# Patient Record
Sex: MALE | Race: WHITE | NOT HISPANIC OR LATINO | Employment: FULL TIME | URBAN - METROPOLITAN AREA
[De-identification: names, ages, dates, MRNs, and addresses within clinical notes are randomized per-mention and may not be internally consistent; named-entity substitution may affect disease eponyms.]

---

## 2017-01-03 ENCOUNTER — ALLSCRIPTS OFFICE VISIT (OUTPATIENT)
Dept: OTHER | Facility: OTHER | Age: 32
End: 2017-01-03

## 2017-02-08 ENCOUNTER — ALLSCRIPTS OFFICE VISIT (OUTPATIENT)
Dept: OTHER | Facility: OTHER | Age: 32
End: 2017-02-08

## 2017-02-27 ENCOUNTER — ALLSCRIPTS OFFICE VISIT (OUTPATIENT)
Dept: OTHER | Facility: OTHER | Age: 32
End: 2017-02-27

## 2017-03-08 ENCOUNTER — ALLSCRIPTS OFFICE VISIT (OUTPATIENT)
Dept: OTHER | Facility: OTHER | Age: 32
End: 2017-03-08

## 2017-03-19 ENCOUNTER — GENERIC CONVERSION - ENCOUNTER (OUTPATIENT)
Dept: OTHER | Facility: OTHER | Age: 32
End: 2017-03-19

## 2017-03-19 ENCOUNTER — HOSPITAL ENCOUNTER (OUTPATIENT)
Facility: HOSPITAL | Age: 32
Setting detail: OBSERVATION
Discharge: HOME | End: 2017-03-20
Attending: EMERGENCY MEDICINE | Admitting: INTERNAL MEDICINE
Payer: COMMERCIAL

## 2017-03-19 ENCOUNTER — APPOINTMENT (EMERGENCY)
Dept: RADIOLOGY | Facility: HOSPITAL | Age: 32
End: 2017-03-19
Payer: COMMERCIAL

## 2017-03-19 DIAGNOSIS — I48.91 ATRIAL FIBRILLATION WITH RAPID VENTRICULAR RESPONSE (HCC): ICD-10-CM

## 2017-03-19 DIAGNOSIS — R00.2 PALPITATIONS: ICD-10-CM

## 2017-03-19 DIAGNOSIS — I48.91 ATRIAL FIBRILLATION (HCC): Primary | ICD-10-CM

## 2017-03-19 LAB
ANION GAP SERPL CALCULATED.3IONS-SCNC: 7 MMOL/L (ref 4–13)
BASOPHILS # BLD AUTO: 0.02 THOUSANDS/ΜL (ref 0–0.1)
BASOPHILS NFR BLD AUTO: 0 % (ref 0–1)
BUN SERPL-MCNC: 17 MG/DL (ref 5–25)
CALCIUM SERPL-MCNC: 9.3 MG/DL (ref 8.3–10.1)
CHLORIDE SERPL-SCNC: 105 MMOL/L (ref 100–108)
CO2 SERPL-SCNC: 32 MMOL/L (ref 21–32)
CREAT SERPL-MCNC: 0.83 MG/DL (ref 0.6–1.3)
EOSINOPHIL # BLD AUTO: 0.09 THOUSAND/ΜL (ref 0–0.61)
EOSINOPHIL NFR BLD AUTO: 1 % (ref 0–6)
ERYTHROCYTE [DISTWIDTH] IN BLOOD BY AUTOMATED COUNT: 12.2 % (ref 11.6–15.1)
GFR SERPL CREATININE-BSD FRML MDRD: >60 ML/MIN/1.73SQ M
GLUCOSE SERPL-MCNC: 76 MG/DL (ref 65–140)
HCT VFR BLD AUTO: 43.6 % (ref 36.5–49.3)
HGB BLD-MCNC: 15.4 G/DL (ref 12–17)
LYMPHOCYTES # BLD AUTO: 3.18 THOUSANDS/ΜL (ref 0.6–4.47)
LYMPHOCYTES NFR BLD AUTO: 35 % (ref 14–44)
MAGNESIUM SERPL-MCNC: 2 MG/DL (ref 1.6–2.6)
MCH RBC QN AUTO: 29.2 PG (ref 26.8–34.3)
MCHC RBC AUTO-ENTMCNC: 35.3 G/DL (ref 31.4–37.4)
MCV RBC AUTO: 83 FL (ref 82–98)
MONOCYTES # BLD AUTO: 0.67 THOUSAND/ΜL (ref 0.17–1.22)
MONOCYTES NFR BLD AUTO: 7 % (ref 4–12)
NEUTROPHILS # BLD AUTO: 5.04 THOUSANDS/ΜL (ref 1.85–7.62)
NEUTS SEG NFR BLD AUTO: 57 % (ref 43–75)
PLATELET # BLD AUTO: 203 THOUSANDS/UL (ref 149–390)
PMV BLD AUTO: 10.6 FL (ref 8.9–12.7)
POTASSIUM SERPL-SCNC: 3.6 MMOL/L (ref 3.5–5.3)
RBC # BLD AUTO: 5.28 MILLION/UL (ref 3.88–5.62)
SODIUM SERPL-SCNC: 144 MMOL/L (ref 136–145)
TROPONIN I SERPL-MCNC: <0.02 NG/ML
TSH SERPL DL<=0.05 MIU/L-ACNC: 1.83 UIU/ML (ref 0.36–3.74)
WBC # BLD AUTO: 9 THOUSAND/UL (ref 4.31–10.16)

## 2017-03-19 PROCEDURE — 36415 COLL VENOUS BLD VENIPUNCTURE: CPT | Performed by: PHYSICIAN ASSISTANT

## 2017-03-19 PROCEDURE — 85025 COMPLETE CBC W/AUTO DIFF WBC: CPT | Performed by: PHYSICIAN ASSISTANT

## 2017-03-19 PROCEDURE — 99285 EMERGENCY DEPT VISIT HI MDM: CPT

## 2017-03-19 PROCEDURE — 96365 THER/PROPH/DIAG IV INF INIT: CPT

## 2017-03-19 PROCEDURE — 84484 ASSAY OF TROPONIN QUANT: CPT | Performed by: PHYSICIAN ASSISTANT

## 2017-03-19 PROCEDURE — 84443 ASSAY THYROID STIM HORMONE: CPT | Performed by: PHYSICIAN ASSISTANT

## 2017-03-19 PROCEDURE — 80048 BASIC METABOLIC PNL TOTAL CA: CPT | Performed by: PHYSICIAN ASSISTANT

## 2017-03-19 PROCEDURE — 83735 ASSAY OF MAGNESIUM: CPT | Performed by: PHYSICIAN ASSISTANT

## 2017-03-19 PROCEDURE — 93005 ELECTROCARDIOGRAM TRACING: CPT | Performed by: PHYSICIAN ASSISTANT

## 2017-03-19 PROCEDURE — 71010 HB CHEST X-RAY 1 VIEW FRONTAL (PORTABLE): CPT

## 2017-03-19 RX ORDER — SODIUM CHLORIDE 9 MG/ML
125 INJECTION, SOLUTION INTRAVENOUS CONTINUOUS
Status: DISCONTINUED | OUTPATIENT
Start: 2017-03-19 | End: 2017-03-19

## 2017-03-19 RX ORDER — ACETAMINOPHEN 325 MG/1
650 TABLET ORAL EVERY 6 HOURS PRN
Status: DISCONTINUED | OUTPATIENT
Start: 2017-03-19 | End: 2017-03-20 | Stop reason: HOSPADM

## 2017-03-19 RX ADMIN — DILTIAZEM HYDROCHLORIDE 10 MG/HR: 5 INJECTION INTRAVENOUS at 21:34

## 2017-03-20 VITALS
WEIGHT: 207.23 LBS | OXYGEN SATURATION: 99 % | BODY MASS INDEX: 29.67 KG/M2 | RESPIRATION RATE: 16 BRPM | SYSTOLIC BLOOD PRESSURE: 94 MMHG | DIASTOLIC BLOOD PRESSURE: 68 MMHG | TEMPERATURE: 98.1 F | HEIGHT: 70 IN | HEART RATE: 70 BPM

## 2017-03-20 PROBLEM — R00.2 PALPITATIONS: Status: RESOLVED | Noted: 2017-03-19 | Resolved: 2017-03-20

## 2017-03-20 LAB
ANION GAP SERPL CALCULATED.3IONS-SCNC: 9 MMOL/L (ref 4–13)
ATRIAL RATE: 182 BPM
BUN SERPL-MCNC: 13 MG/DL (ref 5–25)
CALCIUM SERPL-MCNC: 8.9 MG/DL (ref 8.3–10.1)
CHLORIDE SERPL-SCNC: 105 MMOL/L (ref 100–108)
CO2 SERPL-SCNC: 28 MMOL/L (ref 21–32)
CREAT SERPL-MCNC: 0.71 MG/DL (ref 0.6–1.3)
GFR SERPL CREATININE-BSD FRML MDRD: >60 ML/MIN/1.73SQ M
GLUCOSE P FAST SERPL-MCNC: 84 MG/DL (ref 65–99)
GLUCOSE SERPL-MCNC: 84 MG/DL (ref 65–140)
POTASSIUM SERPL-SCNC: 3.6 MMOL/L (ref 3.5–5.3)
QRS AXIS: 75 DEGREES
QRSD INTERVAL: 86 MS
QT INTERVAL: 288 MS
QTC INTERVAL: 447 MS
SODIUM SERPL-SCNC: 142 MMOL/L (ref 136–145)
T WAVE AXIS: 40 DEGREES
TROPONIN I SERPL-MCNC: <0.02 NG/ML
TROPONIN I SERPL-MCNC: <0.02 NG/ML
VENTRICULAR RATE: 145 BPM

## 2017-03-20 PROCEDURE — 84484 ASSAY OF TROPONIN QUANT: CPT | Performed by: INTERNAL MEDICINE

## 2017-03-20 PROCEDURE — 80048 BASIC METABOLIC PNL TOTAL CA: CPT | Performed by: PHYSICIAN ASSISTANT

## 2017-03-20 RX ORDER — FLECAINIDE ACETATE 50 MG/1
50 TABLET ORAL 2 TIMES DAILY
Qty: 60 TABLET | Refills: 0 | Status: SHIPPED | OUTPATIENT
Start: 2017-03-20 | End: 2017-06-27 | Stop reason: HOSPADM

## 2017-03-20 RX ORDER — FLECAINIDE ACETATE 50 MG/1
300 TABLET ORAL ONCE
Status: COMPLETED | OUTPATIENT
Start: 2017-03-20 | End: 2017-03-20

## 2017-03-20 RX ORDER — POTASSIUM CHLORIDE 20 MEQ/1
40 TABLET, EXTENDED RELEASE ORAL ONCE
Status: COMPLETED | OUTPATIENT
Start: 2017-03-20 | End: 2017-03-20

## 2017-03-20 RX ADMIN — FLECAINIDE ACETATE 300 MG: 50 TABLET ORAL at 12:19

## 2017-03-20 RX ADMIN — POTASSIUM CHLORIDE 40 MEQ: 1500 TABLET, EXTENDED RELEASE ORAL at 12:19

## 2017-03-20 RX ADMIN — DILTIAZEM HYDROCHLORIDE 10 MG/HR: 5 INJECTION INTRAVENOUS at 00:19

## 2017-04-10 ENCOUNTER — ALLSCRIPTS OFFICE VISIT (OUTPATIENT)
Dept: OTHER | Facility: OTHER | Age: 32
End: 2017-04-10

## 2017-04-13 ENCOUNTER — ALLSCRIPTS OFFICE VISIT (OUTPATIENT)
Dept: OTHER | Facility: OTHER | Age: 32
End: 2017-04-13

## 2017-06-26 ENCOUNTER — HOSPITAL ENCOUNTER (OUTPATIENT)
Facility: HOSPITAL | Age: 32
Setting detail: OBSERVATION
Discharge: HOME | End: 2017-06-27
Attending: EMERGENCY MEDICINE | Admitting: INTERNAL MEDICINE
Payer: COMMERCIAL

## 2017-06-26 ENCOUNTER — APPOINTMENT (EMERGENCY)
Dept: RADIOLOGY | Facility: HOSPITAL | Age: 32
End: 2017-06-26
Payer: COMMERCIAL

## 2017-06-26 ENCOUNTER — GENERIC CONVERSION - ENCOUNTER (OUTPATIENT)
Dept: OTHER | Facility: OTHER | Age: 32
End: 2017-06-26

## 2017-06-26 DIAGNOSIS — I95.9 HYPOTENSION: ICD-10-CM

## 2017-06-26 DIAGNOSIS — R55 SYNCOPE: Primary | ICD-10-CM

## 2017-06-26 DIAGNOSIS — Z86.79 ATRIAL FIBRILLATION, CURRENTLY IN SINUS RHYTHM: ICD-10-CM

## 2017-06-26 PROBLEM — I48.0 PAROXYSMAL ATRIAL FIBRILLATION (HCC): Chronic | Status: ACTIVE | Noted: 2017-06-26

## 2017-06-26 LAB
ALBUMIN SERPL BCP-MCNC: 3.5 G/DL (ref 3.5–5)
ALP SERPL-CCNC: 65 U/L (ref 46–116)
ALT SERPL W P-5'-P-CCNC: 29 U/L (ref 12–78)
ANION GAP SERPL CALCULATED.3IONS-SCNC: 8 MMOL/L (ref 4–13)
APTT PPP: 26 SECONDS (ref 23–35)
AST SERPL W P-5'-P-CCNC: 14 U/L (ref 5–45)
ATRIAL RATE: 67 BPM
BASOPHILS # BLD AUTO: 0.02 THOUSANDS/ΜL (ref 0–0.1)
BASOPHILS NFR BLD AUTO: 0 % (ref 0–1)
BILIRUB SERPL-MCNC: 0.4 MG/DL (ref 0.2–1)
BUN SERPL-MCNC: 19 MG/DL (ref 5–25)
CALCIUM SERPL-MCNC: 8.5 MG/DL (ref 8.3–10.1)
CHLORIDE SERPL-SCNC: 106 MMOL/L (ref 100–108)
CO2 SERPL-SCNC: 26 MMOL/L (ref 21–32)
CREAT SERPL-MCNC: 0.73 MG/DL (ref 0.6–1.3)
EOSINOPHIL # BLD AUTO: 0.08 THOUSAND/ΜL (ref 0–0.61)
EOSINOPHIL NFR BLD AUTO: 1 % (ref 0–6)
ERYTHROCYTE [DISTWIDTH] IN BLOOD BY AUTOMATED COUNT: 12.4 % (ref 11.6–15.1)
GFR SERPL CREATININE-BSD FRML MDRD: >60 ML/MIN/1.73SQ M
GLUCOSE SERPL-MCNC: 108 MG/DL (ref 65–140)
HCT VFR BLD AUTO: 43.1 % (ref 36.5–49.3)
HGB BLD-MCNC: 14.9 G/DL (ref 12–17)
INR PPP: 0.94 (ref 0.86–1.16)
LYMPHOCYTES # BLD AUTO: 1.74 THOUSANDS/ΜL (ref 0.6–4.47)
LYMPHOCYTES NFR BLD AUTO: 28 % (ref 14–44)
MAGNESIUM SERPL-MCNC: 2 MG/DL (ref 1.6–2.6)
MCH RBC QN AUTO: 29 PG (ref 26.8–34.3)
MCHC RBC AUTO-ENTMCNC: 34.6 G/DL (ref 31.4–37.4)
MCV RBC AUTO: 84 FL (ref 82–98)
MONOCYTES # BLD AUTO: 0.45 THOUSAND/ΜL (ref 0.17–1.22)
MONOCYTES NFR BLD AUTO: 7 % (ref 4–12)
NEUTROPHILS # BLD AUTO: 3.92 THOUSANDS/ΜL (ref 1.85–7.62)
NEUTS SEG NFR BLD AUTO: 64 % (ref 43–75)
P AXIS: 43 DEGREES
PLATELET # BLD AUTO: 189 THOUSANDS/UL (ref 149–390)
PMV BLD AUTO: 11 FL (ref 8.9–12.7)
POTASSIUM SERPL-SCNC: 4.3 MMOL/L (ref 3.5–5.3)
PR INTERVAL: 140 MS
PROT SERPL-MCNC: 6.6 G/DL (ref 6.4–8.2)
PROTHROMBIN TIME: 12.9 SECONDS (ref 12.1–14.4)
QRS AXIS: 83 DEGREES
QRSD INTERVAL: 108 MS
QT INTERVAL: 400 MS
QTC INTERVAL: 422 MS
RBC # BLD AUTO: 5.13 MILLION/UL (ref 3.88–5.62)
SODIUM SERPL-SCNC: 140 MMOL/L (ref 136–145)
T WAVE AXIS: 47 DEGREES
TROPONIN I SERPL-MCNC: <0.02 NG/ML
TSH SERPL DL<=0.05 MIU/L-ACNC: 1.34 UIU/ML (ref 0.36–3.74)
VENTRICULAR RATE: 67 BPM
WBC # BLD AUTO: 6.21 THOUSAND/UL (ref 4.31–10.16)

## 2017-06-26 PROCEDURE — 85730 THROMBOPLASTIN TIME PARTIAL: CPT | Performed by: EMERGENCY MEDICINE

## 2017-06-26 PROCEDURE — 85025 COMPLETE CBC W/AUTO DIFF WBC: CPT | Performed by: EMERGENCY MEDICINE

## 2017-06-26 PROCEDURE — 93005 ELECTROCARDIOGRAM TRACING: CPT | Performed by: EMERGENCY MEDICINE

## 2017-06-26 PROCEDURE — 93005 ELECTROCARDIOGRAM TRACING: CPT

## 2017-06-26 PROCEDURE — 83735 ASSAY OF MAGNESIUM: CPT | Performed by: EMERGENCY MEDICINE

## 2017-06-26 PROCEDURE — 36415 COLL VENOUS BLD VENIPUNCTURE: CPT | Performed by: EMERGENCY MEDICINE

## 2017-06-26 PROCEDURE — 96360 HYDRATION IV INFUSION INIT: CPT

## 2017-06-26 PROCEDURE — 84443 ASSAY THYROID STIM HORMONE: CPT | Performed by: EMERGENCY MEDICINE

## 2017-06-26 PROCEDURE — 80053 COMPREHEN METABOLIC PANEL: CPT | Performed by: EMERGENCY MEDICINE

## 2017-06-26 PROCEDURE — 71020 HB CHEST X-RAY 2VW FRONTAL&LATL: CPT

## 2017-06-26 PROCEDURE — 84484 ASSAY OF TROPONIN QUANT: CPT | Performed by: EMERGENCY MEDICINE

## 2017-06-26 PROCEDURE — 85610 PROTHROMBIN TIME: CPT | Performed by: EMERGENCY MEDICINE

## 2017-06-26 PROCEDURE — 99285 EMERGENCY DEPT VISIT HI MDM: CPT

## 2017-06-26 RX ORDER — MAGNESIUM HYDROXIDE/ALUMINUM HYDROXICE/SIMETHICONE 120; 1200; 1200 MG/30ML; MG/30ML; MG/30ML
30 SUSPENSION ORAL EVERY 6 HOURS PRN
Status: DISCONTINUED | OUTPATIENT
Start: 2017-06-26 | End: 2017-06-27 | Stop reason: HOSPADM

## 2017-06-26 RX ORDER — ONDANSETRON 2 MG/ML
4 INJECTION INTRAMUSCULAR; INTRAVENOUS EVERY 6 HOURS PRN
Status: DISCONTINUED | OUTPATIENT
Start: 2017-06-26 | End: 2017-06-27 | Stop reason: HOSPADM

## 2017-06-26 RX ORDER — ACETAMINOPHEN 325 MG/1
650 TABLET ORAL EVERY 6 HOURS PRN
Status: DISCONTINUED | OUTPATIENT
Start: 2017-06-26 | End: 2017-06-27 | Stop reason: HOSPADM

## 2017-06-26 RX ORDER — SODIUM CHLORIDE 9 MG/ML
125 INJECTION, SOLUTION INTRAVENOUS CONTINUOUS
Status: DISCONTINUED | OUTPATIENT
Start: 2017-06-26 | End: 2017-06-27 | Stop reason: HOSPADM

## 2017-06-26 RX ADMIN — SODIUM CHLORIDE 1000 ML: 0.9 INJECTION, SOLUTION INTRAVENOUS at 09:00

## 2017-06-26 RX ADMIN — SODIUM CHLORIDE 125 ML/HR: 0.9 INJECTION, SOLUTION INTRAVENOUS at 13:45

## 2017-06-26 RX ADMIN — SODIUM CHLORIDE 125 ML/HR: 0.9 INJECTION, SOLUTION INTRAVENOUS at 19:34

## 2017-06-26 RX ADMIN — SODIUM CHLORIDE 1000 ML: 0.9 INJECTION, SOLUTION INTRAVENOUS at 12:56

## 2017-06-27 ENCOUNTER — GENERIC CONVERSION - ENCOUNTER (OUTPATIENT)
Dept: OTHER | Facility: OTHER | Age: 32
End: 2017-06-27

## 2017-06-27 VITALS
RESPIRATION RATE: 18 BRPM | DIASTOLIC BLOOD PRESSURE: 72 MMHG | HEART RATE: 62 BPM | OXYGEN SATURATION: 98 % | TEMPERATURE: 97.8 F | WEIGHT: 183.2 LBS | SYSTOLIC BLOOD PRESSURE: 124 MMHG | BODY MASS INDEX: 27.13 KG/M2 | HEIGHT: 69 IN

## 2017-06-27 RX ADMIN — SODIUM CHLORIDE 125 ML/HR: 0.9 INJECTION, SOLUTION INTRAVENOUS at 03:28

## 2017-07-12 ENCOUNTER — HOSPITAL ENCOUNTER (EMERGENCY)
Facility: HOSPITAL | Age: 32
Discharge: HOME | End: 2017-07-12
Attending: EMERGENCY MEDICINE | Admitting: EMERGENCY MEDICINE
Payer: COMMERCIAL

## 2017-07-12 ENCOUNTER — GENERIC CONVERSION - ENCOUNTER (OUTPATIENT)
Dept: OTHER | Facility: OTHER | Age: 32
End: 2017-07-12

## 2017-07-12 VITALS
WEIGHT: 195 LBS | BODY MASS INDEX: 28.8 KG/M2 | RESPIRATION RATE: 16 BRPM | OXYGEN SATURATION: 96 % | DIASTOLIC BLOOD PRESSURE: 64 MMHG | SYSTOLIC BLOOD PRESSURE: 114 MMHG | HEART RATE: 80 BPM

## 2017-07-12 DIAGNOSIS — R42 LIGHTHEADEDNESS: Primary | ICD-10-CM

## 2017-07-12 LAB
ALBUMIN SERPL BCP-MCNC: 4 G/DL (ref 3.5–5)
ALP SERPL-CCNC: 78 U/L (ref 46–116)
ALT SERPL W P-5'-P-CCNC: 30 U/L (ref 12–78)
ANION GAP SERPL CALCULATED.3IONS-SCNC: 10 MMOL/L (ref 4–13)
AST SERPL W P-5'-P-CCNC: 13 U/L (ref 5–45)
ATRIAL RATE: 78 BPM
BASOPHILS # BLD AUTO: 0.03 THOUSANDS/ΜL (ref 0–0.1)
BASOPHILS NFR BLD AUTO: 0 % (ref 0–1)
BILIRUB SERPL-MCNC: 0.4 MG/DL (ref 0.2–1)
BUN SERPL-MCNC: 20 MG/DL (ref 5–25)
CALCIUM SERPL-MCNC: 9 MG/DL (ref 8.3–10.1)
CHLORIDE SERPL-SCNC: 104 MMOL/L (ref 100–108)
CO2 SERPL-SCNC: 30 MMOL/L (ref 21–32)
CREAT SERPL-MCNC: 1.18 MG/DL (ref 0.6–1.3)
EOSINOPHIL # BLD AUTO: 0.08 THOUSAND/ΜL (ref 0–0.61)
EOSINOPHIL NFR BLD AUTO: 1 % (ref 0–6)
ERYTHROCYTE [DISTWIDTH] IN BLOOD BY AUTOMATED COUNT: 12.3 % (ref 11.6–15.1)
GFR SERPL CREATININE-BSD FRML MDRD: >60 ML/MIN/1.73SQ M
GLUCOSE SERPL-MCNC: 93 MG/DL (ref 65–140)
HCT VFR BLD AUTO: 41.7 % (ref 36.5–49.3)
HGB BLD-MCNC: 14.4 G/DL (ref 12–17)
LYMPHOCYTES # BLD AUTO: 2.05 THOUSANDS/ΜL (ref 0.6–4.47)
LYMPHOCYTES NFR BLD AUTO: 26 % (ref 14–44)
MCH RBC QN AUTO: 28.9 PG (ref 26.8–34.3)
MCHC RBC AUTO-ENTMCNC: 34.5 G/DL (ref 31.4–37.4)
MCV RBC AUTO: 84 FL (ref 82–98)
MONOCYTES # BLD AUTO: 0.39 THOUSAND/ΜL (ref 0.17–1.22)
MONOCYTES NFR BLD AUTO: 5 % (ref 4–12)
NEUTROPHILS # BLD AUTO: 5.34 THOUSANDS/ΜL (ref 1.85–7.62)
NEUTS SEG NFR BLD AUTO: 68 % (ref 43–75)
P AXIS: 61 DEGREES
PLATELET # BLD AUTO: 194 THOUSANDS/UL (ref 149–390)
PMV BLD AUTO: 10.7 FL (ref 8.9–12.7)
POTASSIUM SERPL-SCNC: 3.9 MMOL/L (ref 3.5–5.3)
PR INTERVAL: 136 MS
PROT SERPL-MCNC: 7 G/DL (ref 6.4–8.2)
QRS AXIS: 80 DEGREES
QRSD INTERVAL: 100 MS
QT INTERVAL: 378 MS
QTC INTERVAL: 430 MS
RBC # BLD AUTO: 4.98 MILLION/UL (ref 3.88–5.62)
SODIUM SERPL-SCNC: 144 MMOL/L (ref 136–145)
SPECIMEN SOURCE: NORMAL
T WAVE AXIS: 32 DEGREES
TROPONIN I BLD-MCNC: 0.01 NG/ML (ref 0–0.08)
VENTRICULAR RATE: 78 BPM
WBC # BLD AUTO: 7.89 THOUSAND/UL (ref 4.31–10.16)

## 2017-07-12 PROCEDURE — 99284 EMERGENCY DEPT VISIT MOD MDM: CPT

## 2017-07-12 PROCEDURE — 84484 ASSAY OF TROPONIN QUANT: CPT

## 2017-07-12 PROCEDURE — 85025 COMPLETE CBC W/AUTO DIFF WBC: CPT | Performed by: EMERGENCY MEDICINE

## 2017-07-12 PROCEDURE — 80053 COMPREHEN METABOLIC PANEL: CPT | Performed by: EMERGENCY MEDICINE

## 2017-07-12 PROCEDURE — 36415 COLL VENOUS BLD VENIPUNCTURE: CPT | Performed by: EMERGENCY MEDICINE

## 2017-07-12 PROCEDURE — 93005 ELECTROCARDIOGRAM TRACING: CPT | Performed by: EMERGENCY MEDICINE

## 2017-07-12 RX ORDER — MULTIVITAMIN
1 CAPSULE ORAL DAILY
COMMUNITY

## 2017-07-13 ENCOUNTER — ALLSCRIPTS OFFICE VISIT (OUTPATIENT)
Dept: OTHER | Facility: OTHER | Age: 32
End: 2017-07-13

## 2017-07-31 ENCOUNTER — ALLSCRIPTS OFFICE VISIT (OUTPATIENT)
Dept: OTHER | Facility: OTHER | Age: 32
End: 2017-07-31

## 2017-11-01 ENCOUNTER — ALLSCRIPTS OFFICE VISIT (OUTPATIENT)
Dept: OTHER | Facility: OTHER | Age: 32
End: 2017-11-01

## 2017-11-02 NOTE — PROGRESS NOTES
Assessment  Assessed   1  Atrial fibrillation (427 31) (I48 91)    Plan  Atrial fibrillation    · EKG/ECG- POC; Status:Complete;   Done: 44ZTG0923  Perform: In Office; EWF:65WCY8246; Last Updated By:Luiz He; 11/1/2017   9:08:18 AM;Ordered; For:Atrial fibrillation; Ordered By:Abhinav Boo;    Discussion/Summary  Cardiology Discussion Summary Free Text Note Form [facility]:   30 yo maleAfib w/ RVR Last episode was June 27, he took 200mg flecainide and metoprolol 25mg and syncopized on bed and loop recorder confirmed mutliple pauses 6s,7s, 19s Pause post conversion  Was bradycardic 40bpm after that in ER  For a couple weeks after that had a few dizzy spells and headaches, felt unwell, but all has resolved  Before that had episode on 3/19/17 and before that was 1/17/16   Converted w/ flecainide 300mg in hospital w/o pause  HR up to 160s, felt palpitations, SOB  GHYJU0Vswk=8  He thinks he had 2 less severe episodes prior to his  Nomal TTE  Loop recorder showed episdoe and initiation was just afib, no svt or other rhythm issues  S/P Loop June 2016 placed due to intermittent symptoms of palpitations, since its placed no afib episodes, but oversensing of T waves and noise w/ deive classified afib    had new baby 2 months ago, fortunately no afib  Feels less palpitations from PVC's etc as well  Physically Active at work  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 and negative coronary angiography  Syncope x3, one above w pause, others both related to ETOH, one on New Years one at [facility] both w/ prodrome likely vagal     Continue to observe off meds, AFib will likely recur at some point, when his life is in order (new baby at home) may consider elective afib ablation  If recurrent afib he should go to ER given long conversion pause last time  No prn flecainide/metop at home  in 6 months        Chief Complaint  Chief Complaint Free Text Note Form: f/u afib      History of Present Illness  Cardiology HPI Free Text Note Form [facility]: 28 yo maleAfib w/ RVR Last episode was June 27, he took 200mg flecainide and metoprolol 25mg and syncopized on bed and loop recorder confirmed mutliple pauses 6s,7s, 19s Pause post conversion  Was bradycardic 40bpm after that in ER  For a couple weeks after that had a few dizzy spells and headaches, felt unwell, but all has resolved  Before that had episode on 3/19/17 and before that was 1/17/16   Converted w/ flecainide 300mg in hospital w/o pause  HR up to 160s, felt palpitations, SOB  SHOSZ3Fyzy=4  He thinks he had 2 less severe episodes prior to his  Nomal TTE  Loop recorder showed episdoe and initiation was just afib, no svt or other rhythm issues  S/P Loop June 2016 placed due to intermittent symptoms of palpitations, since its placed no afib episodes, but oversensing of T waves and noise w/ deive classified afib    had new baby 2 months ago, fortunately no afib  Feels less palpitations from PVC's etc as well  Physically Active at work  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 and negative coronary angiography  Syncope x2 both related to ETOH, one on New Years one at [facility] both w/ prodrome likely vagal H/o Atypical CP lasting 1 min at rare occassions both resting and at work, substernal mild stabbing pain  None recently  Review of Systems  Cardiology Male ROS:     Cardiac: No complaints of chest pain, no palpitations, no fainiting  Skin: No complaints of nonhealing sores or skin rash  Genitourinary: No complaints of recurrent urinary tract infections, frequent urination at night, difficult urination, blood in urine, kidney stones, loss of bladder control, no kidney or prostate problems, no erectile dysfunction  Psychological: No complaints of feeling depressed, anxiety, panic attacks, or difficulty concentrating     General: No complaints of trouble sleeping, lack of energy, fatigue, appetite changes, weight changes, fever, frequent infections, or night sweats  Respiratory: No complaints of shortness of breath, cough with sputum, or wheezing  HEENT: No complaints of serious problems, hearing problems, nose problems, throat problems, or snoring  Gastrointestinal: No complaints of liver problems, nausea, vomiting, heartburn, constipation, bloody stools, diarrhea, problems swallowing, adbominal pain, or rectal bleeding  Hematologic: No complaints of bleeding disorders, anemia, blood clots, or excessive brusing  Neurological: No complaints of numbness, tingling, dizziness, weakness, seizures, headaches, syncope or fainting, AM fatigue, daytime sleepiness, no witnessed apnea episodes  Musculoskeletal: No complaints of arthritis, back pain, or painfull swelling  ROS Reviewed:   ROS reviewed  Active Problems  Problems   1  Atrial fibrillation (427 31) (I48 91)  2  Chest pain (786 50) (R07 9)  3  Palpitations (785 1) (R00 2)  4  Pericarditis (423 9) (I31 9)    Past Medical History  Problems   1  History of cardiac cath (V45 89) (B38 292)  2  History of chest pain (V13 89) (Z87 898)  3  History of echocardiogram (V15 89) (Z92 89)  4  History of pericarditis (V12 59) (Z86 79)  5  History of syncope (V15 89) (Z87 898)  6  History of weakness (V13 89) (Z87 898)  7  History of Lightheadedness (780 4) (R42)  Active Problems And Past Medical History Reviewed: The active problems and past medical history were reviewed and updated today  Surgical History  Problems   1  History of Adenoidectomy  Surgical History Reviewed: The surgical history was reviewed and updated today  Family History  Father   1  Family history of Atrial fibrillation, unspecified type  Family History   2  Family history of Diabetes (250 00) (E11 9)  3  Family history of Enlarged heart (429 3) (I51 7)  Family History Reviewed: The family history was reviewed and updated today         Social History  Problems    · Former smoker (Y02 28) (M82 188)   · Full-time employment   ·    · No drug use   · Non-smoker (V49 89) (Z78 9)   · Occasional alcohol use  Social History Reviewed: The social history was reviewed and updated today  Current Meds  1  Metoprolol Succinate ER 25 MG Oral Tablet Extended Release 24 Hour; take 1 tablet   daily prn; Therapy: 87MQG0036 to (Evaluate:51Fye2368)  Requested for: 40OEC2190 Recorded  2  Multiple Vitamins Oral Tablet; Take 1 daily; Therapy: 28BUE0757 to Recorded  Medication List Reviewed: The medication list was reviewed and updated today  Allergies  Medication   1  No Known Drug Allergies    Vitals  Vital Signs    Recorded: 22JFG3224 09:06AM   Heart Rate 68   Systolic 569, RUE, Sitting   Diastolic 74, RUE, Sitting   Height 5 ft 10 in   Weight 194 lb    BMI Calculated 27 84   BSA Calculated 2 06     Physical Exam    Constitutional - General appearance: No acute distress, well appearing and well nourished  Eyes - Conjunctiva and Sclera examination: Conjunctiva pink, sclera anicteric  Ears, Nose, Mouth, and Throat - External inspection of ears and nose: Normal without deformities or discharge  Neck - Normal, no JVD   Pulmonary - Respiratory effort: No signs of respiratory distress  -- Auscultation of lungs: Clear to auscultation  Cardiovascular - Auscultation of heart: Normal rate and rhythm, normal S1 and S2, no murmurs  -- Examination of extremities for edema and/or varicosities: Normal     Chest - Chest: Normal    Musculoskeletal - Gait and station: Normal gait  Skin - Skin: Normal without rashes  Skin is warm and well perfused  Neurologic - Speech normal  No focal deficits  Psychiatric - Orientation to person, place, and time: Normal       Results/Data  Diagnostic Studies Reviewed Cardio: I personally reviewed the recording/images in the office today  My interpretation follows     Holter/Event Recorder: Holter 1/20/16 was normal, 0 3% PACs, 2 PVC, and no afib HR 43-155bpm      ICD/ Pacer Interpretation Loop recroder interogation did today doesn't show any events since he had 19s post conversion pause   ECG Report: NSR, normal EKG, short OH w/o preexcitation and and early repolarization   EKG from 3/19/17 shows afib RVR 145bpm  EKG from 1/17/16 showed afib w/ RVR 160bpm       Future Appointments    Date/Time Provider Specialty Site   01/18/2018 10:30 AM Cardiology, Device Remote   [address]   05/08/2018 01:30 PM Cardiology, Device Remote   [address]     Signatures   Electronically signed by : JAIR Snow ; Nov 1 2017  9:24AM EST                       (Author)    Electronically signed by : JAIR Snow ; Nov 1 2017  9:24AM EST                       (Author)

## 2017-12-05 ENCOUNTER — ALLSCRIPTS OFFICE VISIT (OUTPATIENT)
Dept: OTHER | Facility: OTHER | Age: 32
End: 2017-12-05

## 2017-12-06 ENCOUNTER — GENERIC CONVERSION - ENCOUNTER (OUTPATIENT)
Dept: OTHER | Facility: OTHER | Age: 32
End: 2017-12-06

## 2017-12-27 ENCOUNTER — ALLSCRIPTS OFFICE VISIT (OUTPATIENT)
Dept: OTHER | Facility: OTHER | Age: 32
End: 2017-12-27

## 2017-12-27 LAB
BILIRUB UR QL STRIP: NORMAL
CLARITY UR: NORMAL
COLOR UR: YELLOW
GLUCOSE (HISTORICAL): NORMAL
HGB UR QL STRIP.AUTO: NORMAL
KETONES UR STRIP-MCNC: NORMAL MG/DL
LEUKOCYTE ESTERASE UR QL STRIP: NORMAL
NITRITE UR QL STRIP: NORMAL
PH UR STRIP.AUTO: 5.5 [PH]
PROT UR STRIP-MCNC: NORMAL MG/DL
SP GR UR STRIP.AUTO: 1.02
UROBILINOGEN UR QL STRIP.AUTO: 0.2

## 2018-01-12 VITALS
WEIGHT: 188.56 LBS | HEART RATE: 80 BPM | HEIGHT: 70 IN | SYSTOLIC BLOOD PRESSURE: 120 MMHG | DIASTOLIC BLOOD PRESSURE: 64 MMHG | BODY MASS INDEX: 26.99 KG/M2

## 2018-01-12 VITALS
HEART RATE: 60 BPM | HEIGHT: 70 IN | DIASTOLIC BLOOD PRESSURE: 74 MMHG | SYSTOLIC BLOOD PRESSURE: 124 MMHG | BODY MASS INDEX: 28.54 KG/M2 | WEIGHT: 199.38 LBS

## 2018-01-13 VITALS
DIASTOLIC BLOOD PRESSURE: 74 MMHG | SYSTOLIC BLOOD PRESSURE: 110 MMHG | HEIGHT: 70 IN | BODY MASS INDEX: 27.77 KG/M2 | HEART RATE: 68 BPM | WEIGHT: 194 LBS

## 2018-01-13 VITALS
WEIGHT: 209.31 LBS | HEIGHT: 70 IN | BODY MASS INDEX: 29.96 KG/M2 | DIASTOLIC BLOOD PRESSURE: 64 MMHG | HEART RATE: 68 BPM | SYSTOLIC BLOOD PRESSURE: 110 MMHG

## 2018-01-15 NOTE — PROCEDURES
Procedures signed by Italia Lane DO at 1/22/2016  1:46 PM       Author:  Italia Lane DO Service:  Cardiology Author Type:  Physician     Filed:  1/22/2016  1:46 PM Date of Service:  1/22/2016  3:03 AM Status:  Signed     :  Italia Lane DO (Physician)              Alix Mccurdy  [id number]  HOLTER MONITOR  01/20/2016    REQUESTING Lena Lane    INDICATION  Syncope    PROCEDURE  The patient was monitored for a total of 23 hours and 59 minutes  Predominant rhythm throughout the tracing noted to be normal sinus   rhythm with an average heart rate of 75 beats per minute  A minimum   heart rate of 43 beats per minute occurred at 2:02 a m  with a maximum   heart rate of 156 beats per minute occurring at 8:22 p m  Rare ventricular ectopic activity was noted consisting of 2 single   PVCs  No sustained ventricular rhythms were noted  Rare supraventricular ectopic activity was noted consisting of 24   single PACs  Eight were noted in atrial couplets  No sustained   supraventricular rhythms were noted  No significant pauses or high grade AV block was noted  Normal diurnal heart rate variation  IMPRESSION  1  Predominant rhythm throughout the tracing noted to be normal sinus   rhythm with an average heart rate of 75 beats per minute  2  Rare ventricular ectopic activity without any sustained ventricular   rhythms  3  Rare supraventricular ectopic activity without any sustained   supraventricular rhythms  4  No symptom rhythm correlation as the patient did not return a diary   with the monitor          Joceline Young  [id number]  D:  01/21/2016 18:28:10  Dictated by:  Valentina Jin DO  T:  01/22/2016 03:03:30    CC:  Valentina Jin DO             Received for:Sylvia Turk DO  Jan 22 2016  1:46PM [facility] Standard Time

## 2018-01-23 VITALS
BODY MASS INDEX: 29.62 KG/M2 | WEIGHT: 200 LBS | DIASTOLIC BLOOD PRESSURE: 72 MMHG | HEIGHT: 69 IN | SYSTOLIC BLOOD PRESSURE: 118 MMHG

## 2018-03-07 NOTE — PROGRESS NOTES
"  Discussion/Summary  Normal device function      Results/Data  Results   Cardiac Device In Clinic [id number] 02:17PM Carla Munda     Test Name Result Flag Reference   MISCELLANEOUS COMMENT      DEVICE INTERROGATED IN THE [place] OFFICE: BATTERY VOLTAGE ADEQUATE  NO PATIENT OR DEVICE ACTIVATED EPISODES  NORMAL DEVICE FUNCTION  WOUND CHECK: INCISION CLEAN AND DRY WITH EDGES APPROXIMATED; WOUND CARE AND RESTRICTIONS REVIEWED WITH PATIENT  NC   Cardiac Electrophysiology Report      [id number]_[id number]_Session [id number]  pdf   DEVICE TYPE Monitor       Cardiac Electrophysiology Report [id number] 02:17PM Carla Munda     Test Name Result Flag Reference   Cardiac Electrophysiology Report      [id number]  pdf     Signatures   Electronically signed by : Catrachita Gregg, ; Yevgeniy 15 2016 10:19AM EST                       (Author)    Electronically signed by : JAIR Trujillo ; Yevgeniy 15 2016 12:50PM EST                       (Author)    "

## 2018-03-07 NOTE — PROGRESS NOTES
"  Discussion/Summary  Normal device function      Results/Data  Cardiac Device Remote [id number] 10:01PM [facility]     Test Name Result Flag Reference   MISCELLANEOUS COMMENT      CARELINK TRANSMISSION: LOOP RECORDER  PRESENTING RHYTHM NSR @ 78 BPM  BATTERY STATUS "OK"  NO PATIENT OR DEVICE ACTIVATED EPISODES  NORMAL DEVICE FUNCTION  DL   Cardiac Electrophysiology Report      sl[id number]  pdf   DEVICE TYPE Monitor       Cardiac Electrophysiology Report [id number] 10:01PM [facility]     Test Name Result Flag Reference   Cardiac Electrophysiology Report      [id number]  pdf     Signatures   Electronically signed by : Alanna Duane, ; Jul 13 2017  2:33PM EST                       (Author)    Electronically signed by : JAIR Felder ; Jul 13 2017  3:00PM EST                       (Author)    "

## 2018-03-07 NOTE — PROGRESS NOTES
"  Discussion/Summary  Normal device function      Results/Data  Cardiac Device Remote 10Apr2017 02:37PM Oscar Robison     Test Name Result Flag Reference   MISCELLANEOUS COMMENT (Report)     CARELINK TRANSMISSION: BATTERY STATUS "OK"  DEVICE CLASSIFIED 2 AF EPISODES: 17 5 HRS OF AF W/ AVG VENT RATE-154 BPM ON 3/19/17- PT WENT TO ER & CONVERTED ON FLECAINIDE; NSR W/ T WAVE OVERSENSING ON 3/12/17 EPISODE  1 PT ACTIVATED EPISODE PREVIOUSLY ADDRESSED  AF BURDEN-2 2%  PRESENTING RHYTHM NSR  NORMAL DEVICE FUNCTION  GV   Cardiac Electrophysiology Report      [id number]  pdf   DEVICE TYPE Monitor       Cardiac Electrophysiology Report [id number] 02:37PM Oscar Robison     Test Name Result Flag Reference   Cardiac Electrophysiology Report      [id number]  pdf     Signatures   Electronically signed by : Monica Larsen RN; Apr 10 2017  1:08PM EST                       (Author)    Electronically signed by : JAIR Pretty ; Apr 10 2017  1:19PM EST                       (Author)    "

## 2018-03-07 NOTE — PROGRESS NOTES
"  Discussion/Summary  Normal device function      Results/Data  Cardiac Device Remote [id number] 03:06PM Durward Fuelling     Test Name Result Flag Reference   MISCELLANEOUS COMMENT (Report)     CARELINK TRANSMISSION: LOOP RECORDER  PRESENTING RHYTHM NSR @ 69 BPM  BATTERY STATUS "OK"  1 TACHY EPISODE W/ EGRAM SHOWING NSR W/ MYOPOTENTIAL OVERSENSING  1 AF EPISODE W/ EGRAM SHOWING NSR W/ T WAVE OVESENSING  REPORT DELAYED DUE TO WAITING FOR PT TO SEND FULL TRANSMISSION  NO PATIENT ACTIVATED EPISODES  NORMAL DEVICE FUNCTION  DL/CP   Cardiac Electrophysiology Report      [id number]}  pdf   DEVICE TYPE Monitor       Cardiac Electrophysiology Report [id number] 03:06PM Durward Fuelling     Test Name Result Flag Reference   Cardiac Electrophysiology Report      [id number]  pdf     Signatures   Electronically signed by : Shonda Trevino, ; Sep 13 2016 11:57AM EST                       (Author)    Electronically signed by : JAIR Clark ; Sep 13 2016 12:40PM EST                       (Author)    "

## 2018-03-07 NOTE — PROGRESS NOTES
"  Discussion/Summary  Normal device function      Results/Data  Cardiac Device Remote [id number] 03:17PM Balbina Cotto     Test Name Result Flag Reference   MISCELLANEOUS COMMENT      CARELINK TRANSMISSION: LOOP RECORDER  PRESENTING RHYTHM SR @ 77 BPM  BATTERY STATUS "OK"  2 AF EPISODES W/ EGRAMS SHOWING NSR W/ T WAVE OVERSENSING  NO PATIENT ACTIVATED EPISODES  NORMAL DEVICE FUNCTION  DL   Cardiac Electrophysiology Report      [id number]  pdf   DEVICE TYPE Monitor       Cardiac Electrophysiology Report [id number] 03:17PM Balbina Ctoto     Test Name Result Flag Reference   Cardiac Electrophysiology Report      [id number]  pdf     Signatures   Electronically signed by : Day Jaime, ; Piotr  3 2017 11:22AM EST                       (Author)    Electronically signed by : JAIR Camarillo ; Piotr  3 2017 11:48AM EST                       (Author)    "

## 2018-03-07 NOTE — PROGRESS NOTES
"  Discussion/Summary  Normal device function      Results/Data  Cardiac Device Remote [id number] 03:36PM Karena Palacio     Test Name Result Flag Reference   MISCELLANEOUS COMMENT      CARELINK TRANSMISSION: LOOP RECORDER  PRESENTING RHYTHM NSR W/ PAC @ 62 BPM  BATTERY STATUS "OK"  NO PATIENT OR DEVICE ACTIVATED EPISODES  NORMAL DEVICE FUNCTION  DL   Cardiac Electrophysiology Report      [id number]_[id number]  pdf   DEVICE TYPE Monitor       Cardiac Electrophysiology Report [id number] 03:36PM Karena Palacio     Test Name Result Flag Reference   Cardiac Electrophysiology Report      [id number]  pdf     Signatures   Electronically signed by : Alanna Duane, ; Nov 29 2016 11:04AM EST                       (Author)    Electronically signed by : Melvin Milan DO; Dec  4 2016  1:52PM EST                       (Author)    "

## 2018-03-07 NOTE — PROGRESS NOTES
Discussion/Summary  Normal device function      Signatures   Electronically signed by : JAIR Rhodes ; Feb 8 2017  5:00PM EST                       (Author)

## 2018-03-07 NOTE — PROGRESS NOTES
"  Discussion/Summary  Normal device function      Results/Data  Cardiac Device Remote [id number] 12:56AM Lisette Aus     Test Name Result Flag Reference   MISCELLANEOUS COMMENT      CARELINK TRANSMISSION: LOOP RECORDER  PRESENTING RHYTHM NSR @ 93 BPM  BATTERY STATUS "OK"  NO PATIENT OR DEVICE ACTIVATED EPISODES  NORMAL DEVICE FUNCTION  DL   Cardiac Electrophysiology Report      [id number]  pdf   DEVICE TYPE Monitor       Cardiac Electrophysiology Report [id number] 12:56AM Lisette Aus     Test Name Result Flag Reference   Cardiac Electrophysiology Report      [id number]  pdf     Signatures   Electronically signed by : Luca Johnson, ; Dec  5 2017 10:12AM EST                       (Author)    Electronically signed by : JAIR Saenz ; Dec  5 2017 11:12AM EST                       (Author)    "

## 2018-03-07 NOTE — PROGRESS NOTES
"  Discussion/Summary  Normal device function      Results/Data  Cardiac Device Remote 10Sep2016 01:15AM Sha George     Test Name Result Flag Reference   MISCELLANEOUS COMMENT      CARELINK TRANSMISSION: BATTERY STATUS "OK"  DEVICE CLASSIFIED 4 NEW AF EPISODES (OTHER EPISODES PREVIOUSLY ADDRESSED)  NSR W/ OVERSENSING NOISE & T WAVES ON EGM'S; NO AF  NO NEW PT ACTIVATED EPISODES  PRESENTING RHYTHM NSR  NORMAL DEVICE FUNCTION  GV   Cardiac Electrophysiology Report      slh[id number]_[id number]  pdf   DEVICE TYPE Monitor       Cardiac Electrophysiology Report [id number] 01:15AM Sha Vazquez     Test Name Result Flag Reference   Cardiac Electrophysiology Report      [id number]  pdf     Signatures   Electronically signed by : Jaclyn Guerin RN; Sep  9 2016  3:28PM EST                       (Author)    Electronically signed by : JAIR Martinez ; Sep 12 2016  8:17AM EST                       (Author)    "

## 2018-03-07 NOTE — PROGRESS NOTES
"  Discussion/Summary  Normal device function      Results/Data  Cardiac Device Remote [id number] 05:54PM Virgilio Limber     Test Name Result Flag Reference   MISCELLANEOUS COMMENT      CARELINK TRANSMISSION: LOOP RECORDER  PRESENTING RHYTHM NSR @ 85 BPM  BATTERY STATUS "OK"  NO PATIENT OR DEVICE ACTIVATED EPISODES  NORMAL DEVICE FUNCTION  DL   Cardiac Electrophysiology Report      [id number]  pdf   DEVICE TYPE Monitor       Cardiac Electrophysiology Report [id number] 05:54PM Virgilio Limber     Test Name Result Flag Reference   Cardiac Electrophysiology Report      [id number]  pdf     Signatures   Electronically signed by : Chuy Ling, ; Feb 8 2017  3:11PM EST                       (Author)    Electronically signed by : JAIR Martines ; Feb 8 2017  5:04PM EST                       (Author)    "

## 2018-03-07 NOTE — PROGRESS NOTES
"  Discussion/Summary  Normal device function      Results/Data  Cardiac Device Remote [id number] 08:38PM Durward Fuelling     Test Name Result Flag Reference   MISCELLANEOUS COMMENT      CARELINK TRANSMISSION: LOOP RECORDER  PRESENTING RHYTHM NSR @ 81 BPM  BATTERY STATUS "OK"  2 AF EPISODES W/ EGRAMS SHOWING NSR W/ T WAVE OVERSENSING, RARE UNDERSENSING AND MYOPOTENTIAL OVERSENSING  NO PATIENT ACTIVATED EPISODES  NORMAL DEVICE FUNCTION  DL   Cardiac Electrophysiology Report      slh[id number]  pdf   DEVICE TYPE Monitor       Cardiac Electrophysiology Report [id number] 08:38PM Durward Fuelling     Test Name Result Flag Reference   Cardiac Electrophysiology Report      [id number]  pdf     Signatures   Electronically signed by : Shonda Trevino, ; Mar  8 2017 12:02PM EST                       (Author)    Electronically signed by : JAIR Clark ; Mar  8 2017 12:40PM EST                       (Author)    "

## 2018-07-05 ENCOUNTER — HOSPITAL ENCOUNTER (OUTPATIENT)
Facility: HOSPITAL | Age: 33
Setting detail: OBSERVATION
Discharge: HOME | End: 2018-07-06
Attending: EMERGENCY MEDICINE | Admitting: INTERNAL MEDICINE
Payer: COMMERCIAL

## 2018-07-05 DIAGNOSIS — I48.91 ATRIAL FIBRILLATION WITH RAPID VENTRICULAR RESPONSE (HCC): Primary | ICD-10-CM

## 2018-07-05 DIAGNOSIS — I48.0 PAROXYSMAL ATRIAL FIBRILLATION (HCC): Chronic | ICD-10-CM

## 2018-07-05 LAB
ANION GAP SERPL CALCULATED.3IONS-SCNC: 6 MMOL/L (ref 4–13)
ATRIAL RATE: 197 BPM
BASOPHILS # BLD AUTO: 0.03 THOUSANDS/ΜL (ref 0–0.1)
BASOPHILS NFR BLD AUTO: 0 % (ref 0–1)
BUN SERPL-MCNC: 20 MG/DL (ref 5–25)
CALCIUM SERPL-MCNC: 9.1 MG/DL (ref 8.3–10.1)
CHLORIDE SERPL-SCNC: 103 MMOL/L (ref 100–108)
CO2 SERPL-SCNC: 32 MMOL/L (ref 21–32)
CREAT SERPL-MCNC: 0.74 MG/DL (ref 0.6–1.3)
EOSINOPHIL # BLD AUTO: 0.1 THOUSAND/ΜL (ref 0–0.61)
EOSINOPHIL NFR BLD AUTO: 1 % (ref 0–6)
ERYTHROCYTE [DISTWIDTH] IN BLOOD BY AUTOMATED COUNT: 12.2 % (ref 11.6–15.1)
GFR SERPL CREATININE-BSD FRML MDRD: 123 ML/MIN/1.73SQ M
GLUCOSE SERPL-MCNC: 92 MG/DL (ref 65–140)
HCT VFR BLD AUTO: 47.4 % (ref 36.5–49.3)
HGB BLD-MCNC: 16.6 G/DL (ref 12–17)
LYMPHOCYTES # BLD AUTO: 2.19 THOUSANDS/ΜL (ref 0.6–4.47)
LYMPHOCYTES NFR BLD AUTO: 28 % (ref 14–44)
MCH RBC QN AUTO: 29.5 PG (ref 26.8–34.3)
MCHC RBC AUTO-ENTMCNC: 35 G/DL (ref 31.4–37.4)
MCV RBC AUTO: 84 FL (ref 82–98)
MONOCYTES # BLD AUTO: 0.56 THOUSAND/ΜL (ref 0.17–1.22)
MONOCYTES NFR BLD AUTO: 7 % (ref 4–12)
NEUTROPHILS # BLD AUTO: 4.91 THOUSANDS/ΜL (ref 1.85–7.62)
NEUTS SEG NFR BLD AUTO: 63 % (ref 43–75)
PLATELET # BLD AUTO: 212 THOUSANDS/UL (ref 149–390)
PMV BLD AUTO: 10.6 FL (ref 8.9–12.7)
POTASSIUM SERPL-SCNC: 4.1 MMOL/L (ref 3.5–5.3)
QRS AXIS: 88 DEGREES
QRSD INTERVAL: 92 MS
QT INTERVAL: 302 MS
QTC INTERVAL: 483 MS
RBC # BLD AUTO: 5.63 MILLION/UL (ref 3.88–5.62)
SODIUM SERPL-SCNC: 141 MMOL/L (ref 136–145)
T WAVE AXIS: 38 DEGREES
VENTRICULAR RATE: 154 BPM
WBC # BLD AUTO: 7.79 THOUSAND/UL (ref 4.31–10.16)

## 2018-07-05 PROCEDURE — 85025 COMPLETE CBC W/AUTO DIFF WBC: CPT | Performed by: EMERGENCY MEDICINE

## 2018-07-05 PROCEDURE — 99220 PR INITIAL OBSERVATION CARE/DAY 70 MINUTES: CPT | Performed by: INTERNAL MEDICINE

## 2018-07-05 PROCEDURE — 36415 COLL VENOUS BLD VENIPUNCTURE: CPT | Performed by: EMERGENCY MEDICINE

## 2018-07-05 PROCEDURE — 99285 EMERGENCY DEPT VISIT HI MDM: CPT

## 2018-07-05 PROCEDURE — 93005 ELECTROCARDIOGRAM TRACING: CPT

## 2018-07-05 PROCEDURE — 99244 OFF/OP CNSLTJ NEW/EST MOD 40: CPT | Performed by: INTERNAL MEDICINE

## 2018-07-05 PROCEDURE — 93010 ELECTROCARDIOGRAM REPORT: CPT | Performed by: INTERNAL MEDICINE

## 2018-07-05 PROCEDURE — 96365 THER/PROPH/DIAG IV INF INIT: CPT

## 2018-07-05 PROCEDURE — 80048 BASIC METABOLIC PNL TOTAL CA: CPT | Performed by: EMERGENCY MEDICINE

## 2018-07-05 RX ORDER — DILTIAZEM HYDROCHLORIDE 5 MG/ML
15 INJECTION INTRAVENOUS ONCE
Status: COMPLETED | OUTPATIENT
Start: 2018-07-05 | End: 2018-07-05

## 2018-07-05 RX ORDER — ONDANSETRON 2 MG/ML
4 INJECTION INTRAMUSCULAR; INTRAVENOUS EVERY 4 HOURS PRN
Status: DISCONTINUED | OUTPATIENT
Start: 2018-07-05 | End: 2018-07-06 | Stop reason: HOSPADM

## 2018-07-05 RX ADMIN — RIVAROXABAN 20 MG: 20 TABLET, FILM COATED ORAL at 13:26

## 2018-07-05 RX ADMIN — METOPROLOL TARTRATE 25 MG: 25 TABLET ORAL at 20:44

## 2018-07-05 RX ADMIN — DILTIAZEM HYDROCHLORIDE 15 MG: 5 INJECTION INTRAVENOUS at 08:39

## 2018-07-05 RX ADMIN — DILTIAZEM HYDROCHLORIDE 10 MG/HR: 5 INJECTION INTRAVENOUS at 08:39

## 2018-07-05 RX ADMIN — METOPROLOL TARTRATE 25 MG: 25 TABLET ORAL at 13:26

## 2018-07-05 NOTE — CONSULTS
Consultation - Cardiology   Booker Amin 34 y o  male MRN: [id number]  Unit/Bed#: -01 Encounter: [id number]    Inpatient consult to Cardiology  Consult performed by: Kadi Scott ordered by: Jenny Bowman          History of Present Illness   Physician Requesting Consult: Lexa Guerra MD  Reason for Consult / Principal Problem: Afib    HPI: Booker Amin is a 34y o  year old male who has a history of paroxysmal atrial fibrillation, last occurring approximately 1 year ago, who presented to [facility] today with palpitations and lightheadedness  She was found to be in atrial fibrillation with a rapid ventricular response of approximately 160 beats per minute  He was started on IV diltiazem, and is less symptomatic  However, he remains in atrial fibrillation  Approximately 1 year ago when he came late fibrillation he did convert with metoprolol and flecainide, however this was followed by a an offset pause of 19 sec and profound bradycardia  He reports no inciting factors at this time  Currently, he is in atrial fibrillation rate controlled and essentially asymptomatic  Review of Systems   Constitution: Negative for chills, diaphoresis, fever and malaise/fatigue  HENT: Negative  Eyes: Negative  Cardiovascular: Positive for palpitations  Negative for chest pain, dyspnea on exertion and leg swelling  Respiratory: Negative for cough, shortness of breath, snoring and wheezing  Endocrine: Negative  Hematologic/Lymphatic: Negative  Skin: Negative for rash  Musculoskeletal: Negative  Gastrointestinal: Negative for abdominal pain, constipation and diarrhea  Genitourinary: Negative for bladder incontinence, dysuria and frequency  Neurological: Negative for dizziness and light-headedness  Psychiatric/Behavioral: Negative  All other systems reviewed and are negative      Historical Information   Past Medical History:   Diagnosis Date    Atrial fibrillation ([address] )     Palpitations     Pericarditis 12/2012    Admission to [facility] at that time    Status post placement of implantable loop recorder      Past Surgical History:   Procedure Laterality Date    ADENOIDECTOMY       History   Alcohol Use No     History   Drug Use No     History   Smoking Status    Former Smoker   Smokeless Tobacco    Never Used     Family History: non-contributory    Meds/Allergies       No current facility-administered medications for this encounter  Prescriptions Prior to Admission   Medication    Multiple Vitamin (MULTIVITAMIN) capsule       No Known Allergies    Objective   Vitals: Blood pressure 105/66, pulse 67, temperature 98 5 °F (36 9 °C), temperature source Oral, resp  rate 20, height 5' 9" (1 753 m), weight 89 kg (196 lb 3 4 oz), SpO2 98 %  , Body mass index is 28 98 kg/m² , Orthostatic Blood Pressures      Most Recent Value   Blood Pressure  105/66 filed at 07/05/2018 0944   Patient Position - Orthostatic VS  Sitting filed at 07/05/2018 5869        Systolic (34XWM), SBL:730 , Min:105 , POF:346     Diastolic (70IAJ), IEQ:20, Min:53, Max:86      Intake/Output Summary (Last 24 hours) at 07/05/18 1252  Last data filed at 07/05/18 1146   Gross per 24 hour   Intake                0 ml   Output             1250 ml   Net            -1250 ml       Weight (last 2 days)     Date/Time   Weight    07/05/18 0944  89 (196 21)    07/05/18 0720  85 9 (189 38)              Invasive Devices     Peripheral Intravenous Line            Peripheral IV 07/05/18 Left Antecubital less than 1 day                  Physical Exam   Constitutional: He is oriented to person, place, and time  He appears well-developed and well-nourished  HENT:   Head: Normocephalic and atraumatic  Neck: No JVD present  Cardiovascular: Normal rate and normal heart sounds  An irregularly irregular rhythm present  Exam reveals no gallop and no friction rub  No murmur heard    Pulmonary/Chest: Effort normal and breath sounds normal  He has no wheezes  He has no rales  Abdominal: Soft  Bowel sounds are normal  He exhibits no distension  There is no tenderness  Musculoskeletal: He exhibits no edema  Neurological: He is alert and oriented to person, place, and time  He has normal reflexes  Skin: Skin is warm and dry  No rash noted  No erythema  Psychiatric: He has a normal mood and affect  Laboratory Results:        CBC with diff:   Results from last 7 days  Lab Units 18  0735   WBC Thousand/uL 7 79   HEMOGLOBIN g/dL 16 6   HEMATOCRIT % 47 4   MCV fL 84   PLATELETS Thousands/uL 212   MCH pg 29 5   MCHC g/dL 35 0   RDW % 12 2   MPV fL 10 6         CMP:  Results from last 7 days  Lab Units 18  0735   SODIUM mmol/L 141   POTASSIUM mmol/L 4 1   CHLORIDE mmol/L 103   CO2 mmol/L 32   ANION GAP mmol/L 6   BUN mg/dL 20   CREATININE mg/dL 0 74   GLUCOSE RANDOM mg/dL 92   CALCIUM mg/dL 9 1   EGFR ml/min/1 73sq m 123         BMP:  Results from last 7 days  Lab Units 18  0735   SODIUM mmol/L 141   POTASSIUM mmol/L 4 1   CHLORIDE mmol/L 103   CO2 mmol/L 32   BUN mg/dL 20   CREATININE mg/dL 0 74   GLUCOSE RANDOM mg/dL 92   CALCIUM mg/dL 9 1     Cardiac testing:   Results for orders placed during the hospital encounter of 16   Echo complete with contrast if indicated    Narrative [address] [place] [address], [address] [place] [address]  [phone]    Transthoracic Echocardiogram  2D, M-mode, Doppler, and Color Doppler    Study date:  2016    Patient: Joel Solo  MR number: [id number]  Account number: [de-identified]  : 1985  Age: 31 years  Gender: Male  Status: Outpatient  [facility]: [facility]  Height: 70 in  Weight: 189 6 lb  BP: 108/ 64 mmHg    Indications: Atrial fibrillation      Diagnoses: I48 0 - Atrial fibrillation    Sonographer:  BELL Alexander  Referring Physician:  Miya Tolentino DO  Group:  [facility]  cc: Shara Tai MD  Interpreting Physician:  Trina Spivey MD    SUMMARY    LEFT VENTRICLE:  Size was normal   Systolic function was normal  Ejection fraction was estimated to be 55 %  Wall thickness was normal   Left ventricular diastolic function parameters were normal     RIGHT VENTRICLE:  The size was normal   Systolic function was normal     TRICUSPID VALVE:  There was trace regurgitation  HISTORY: PRIOR HISTORY: Atrial Fibrillation, Chest Pain, Pericarditis,    PROCEDURE: The study was performed in the [address] [place] [address]  This was a routine study  The transthoracic approach was used  The study  included complete 2D imaging, M-mode, complete spectral Doppler, and color  Doppler  The heart rate was 60 bpm, at the start of the study  Images were  obtained from the parasternal, apical, subcostal, and suprasternal notch  acoustic windows  Image quality was good  LEFT VENTRICLE: Size was normal  Systolic function was normal  Ejection  fraction was estimated to be 55 %  There were no regional wall motion  abnormalities  Wall thickness was normal  DOPPLER: Left ventricular diastolic  function parameters were normal     RIGHT VENTRICLE: The size was normal  Systolic function was normal  Wall  thickness was normal     LEFT ATRIUM: Size was normal     RIGHT ATRIUM: Size was normal     MITRAL VALVE: Valve structure was normal  There was normal leaflet separation  DOPPLER: The transmitral velocity was within the normal range  There was no  evidence for stenosis  There was no regurgitation  AORTIC VALVE: The valve was trileaflet  Leaflets exhibited normal thickness and  normal cuspal separation  DOPPLER: Transaortic velocity was within the normal  range  There was no evidence for stenosis  There was no regurgitation  TRICUSPID VALVE: The valve structure was normal  There was normal leaflet  separation  DOPPLER: The transtricuspid velocity was within the normal range    There was no evidence for stenosis  There was trace regurgitation  The  tricuspid jet envelope definition was inadequate for estimation of RV systolic  pressure  There are no indirect findings suggestive of moderate or severe  pulmonary hypertension  PULMONIC VALVE: Leaflets exhibited normal thickness, no calcification, and  normal cuspal separation  DOPPLER: The transpulmonic velocity was within the  normal range  There was no regurgitation  PERICARDIUM: There was no pericardial effusion  The pericardium was normal in  appearance  AORTA: The root exhibited normal size  SYSTEMIC VEINS: IVC: The inferior vena cava was normal in size and course  Respirophasic changes were normal     SYSTEM MEASUREMENT TABLES    2D  %FS: 30 56 %  AV Diam: 3 16 cm  EDV(Teich): 114 31 ml  EF Biplane: 50 74 %  EF(Cube): 66 52 %  EF(Teich): 57 86 %  ESV(Cube): 40 06 ml  ESV(Teich): 48 17 ml  IVSd: 0 88 cm  LA Area: 19 cm2  LA Diam: 3 8 cm  LVEDV MOD A2C: 138 49 ml  LVEDV MOD A4C: 93 85 ml  LVEDV MOD BP: 119 51 ml  LVEF MOD A2C: 49 47 %  LVEF MOD A4C: 53 87 %  LVESV MOD A2C: 69 98 ml  LVESV MOD A4C: 43 29 ml  LVESV MOD BP: 58 87 ml  LVIDd: 4 93 cm  LVIDs: 3 42 cm  LVLd A2C: 10 02 cm  LVLd A4C: 9 1 cm  LVLs A2C: 8 5 cm  LVLs A4C: 7 37 cm  LVPWd: 0 89 cm  RA Area: 18 12 cm2  RV Diam: 3 64 cm  SI(Cube): 39 02 ml/m2  SI(Teich): 32 42 ml/m2  SV MOD A2C: 68 51 ml  SV MOD A4C: 50 56 ml  SV(Cube): 79 6 ml  SV(Teich): 66 14 ml    MM  TAPSE: 2 3 cm    PW  E': 0 1 m/s  E/E': 8 56  MV A Humberto: 0 54 m/s  MV Dec [place]: 5 58 m/s2  MV DecT: 148 04 ms  MV E Humberto: 0 83 m/s  MV E/A Ratio: 1 52    [employer] Accredited Echocardiography Laboratory    Prepared and electronically signed by    Miguel Estevez MD  Signed 20-Jan-2016 15:16:47         EKG reviewed personally: Afib 150  Telemetry reviewed personally: Afib    Assessment:  Principal Problem:    Atrial fibrillation with rapid ventricular response (HCC)      Impression:  1   Paroxysmal atrial fibrillation with RVR - on diltiazem gtt  Will start anticoagulation temporarily, and b-blockers  If converts to NSR, will discharge on b-blockers  If does not, will proceed with JAYLEN/DCCV tomorrow  Plan:  1  Start metoprolol tartrate 25mg BID  2  Start Xarelto 20mg daily - although CHADS2 score 0, will anticoagulate given cardioversion  3  Continue diltiazem gtt for now  4  If remains in atrial fibrillation tomorrow, will proceed with cardioversion

## 2018-07-05 NOTE — ED NOTES
EKG completed at 07:27, viewed and signed by Dr Sylvain Montiel, copy on chart     Eugene Llanos  07/05/18 0986

## 2018-07-05 NOTE — PROGRESS NOTES
Pt on Cardizem drip at 10/hr  Heart rate at rest between 80 and 100bpm   Upon movement heart rate rapidly increases into 150s-170s  Heart rate  Goes back into 80s to 100s once patient stops moving

## 2018-07-05 NOTE — ED PROVIDER NOTES
History  Chief Complaint   Patient presents with    Rapid Heart Rate     Pt states that he woke up this morning and felt like he was in a-fib  29-year-old male history of paroxysmal atrial fibrillation, presents with recurrent atrial fibrillation  States he woke up this morning can tell he was back in it , rapid irregular heart rate  Did state he woke up around midnight and felt funny he is unsure if use in atrial fibrillation of the time but as it was late he went back to sleep  Patient follows with electrophysiology actually seeing them next week  , patient currently has a loop recorder in  History provided by:  Patient and spouse  Rapid Heart Rate   Palpitations quality:  Irregular  Onset quality:  Sudden  Duration: Unclear exact time of onset but less than 12 hours as he was asymptomatic going to sleep last night  Timing:  Constant  Progression:  Unchanged  Chronicity:  Recurrent  Context: not stimulant use    Relieved by:  None tried  Worsened by:  Nothing  Ineffective treatments:  None tried  Associated symptoms: no chest pain, no chest pressure, no cough, no diaphoresis, no dizziness, no nausea, no numbness, no orthopnea, no shortness of breath, no vomiting and no weakness        Prior to Admission Medications   Prescriptions Last Dose Informant Patient Reported? Taking?    Multiple Vitamin (MULTIVITAMIN) capsule   Yes No   Sig: Take 1 capsule by mouth daily   metoprolol tartrate (LOPRESSOR) 25 mg tablet   Yes No   Sig: Take 25 mg by mouth daily as needed        Facility-Administered Medications: None       Past Medical History:   Diagnosis Date    Atrial fibrillation ([address] )     Palpitations     Pericarditis 12/2012    [facility] at that time    Status post placement of implantable loop recorder        Past Surgical History:   Procedure Laterality Date    ADENOIDECTOMY         Family History   Problem Relation Age of Onset    Atrial fibrillation Father      I have reviewed and agree with the history as documented  Social History   Substance Use Topics    Smoking status: Former Smoker    Smokeless tobacco: Never Used    Alcohol use No        Review of Systems   Constitutional: Negative for activity change, chills, diaphoresis and fever  HENT: Negative for congestion, sinus pressure and sore throat  Eyes: Negative for pain and visual disturbance  Respiratory: Negative for cough, chest tightness, shortness of breath, wheezing and stridor  Cardiovascular: Positive for palpitations  Negative for chest pain and orthopnea  Gastrointestinal: Negative for abdominal distention, abdominal pain, constipation, diarrhea, nausea and vomiting  Genitourinary: Negative for dysuria and frequency  Musculoskeletal: Negative for neck pain and neck stiffness  Skin: Negative for rash  Neurological: Negative for dizziness, speech difficulty, weakness, light-headedness, numbness and headaches  Physical Exam  Physical Exam   Constitutional: He is oriented to person, place, and time  He appears well-developed  HENT:   Head: Normocephalic and atraumatic  Eyes: Pupils are equal, round, and reactive to light  Neck: Normal range of motion  Neck supple  No tracheal deviation present  Cardiovascular: Normal heart sounds and intact distal pulses  An irregularly irregular rhythm present  Tachycardia present  No murmur heard  Pulmonary/Chest: Effort normal and breath sounds normal  No stridor  No respiratory distress  Abdominal: Soft  He exhibits no distension  There is no tenderness  There is no rebound and no guarding  Musculoskeletal: Normal range of motion  Neurological: He is alert and oriented to person, place, and time  Skin: Skin is warm and dry  He is not diaphoretic  No erythema  No pallor  Psychiatric: He has a normal mood and affect  Vitals reviewed        Vital Signs  ED Triage Vitals [07/05/18 0720]   Temp Pulse Respirations Blood Pressure SpO2   -- 64 16 135/68 100 %      Temp src Heart Rate Source Patient Position - Orthostatic VS BP Location FiO2 (%)   -- Monitor Lying Right arm --      Pain Score       No Pain           Vitals:    07/05/18 0720 07/05/18 0800   BP: 135/68 114/66   Pulse: 64 (!) 154   Patient Position - Orthostatic VS: Lying        Visual Acuity      ED Medications  Medications   diltiazem (CARDIZEM) injection 15 mg (15 mg Intravenous Given 7/5/18 0839)   diltiazem (CARDIZEM) 125 mg in sodium chloride 0 9 % 125 mL infusion (10 mg/hr Intravenous New Bag 7/5/18 0839)       Diagnostic Studies  Results Reviewed     Procedure Component Value Units Date/Time    Basic metabolic panel [[id number]] Collected:  07/05/18 0735    Lab Status:  Final result Specimen:  Blood from Arm, Left Updated:  07/05/18 5310     Sodium 141 mmol/L      Potassium 4 1 mmol/L      Chloride 103 mmol/L      CO2 32 mmol/L      Anion Gap 6 mmol/L      BUN 20 mg/dL      Creatinine 0 74 mg/dL      Glucose 92 mg/dL      Calcium 9 1 mg/dL      eGFR 123 ml/min/1 73sq m     Narrative:         National Kidney Disease Education Program recommendations are as follows:  GFR calculation is accurate only with a steady state creatinine  Chronic Kidney disease less than 60 ml/min/1 73 sq  meters  Kidney failure less than 15 ml/min/1 73 sq  meters      CBC and differential [[id number]]  (Abnormal) Collected:  07/05/18 0735    Lab Status:  Final result Specimen:  Blood from Arm, Left Updated:  07/05/18 0741     WBC 7 79 Thousand/uL      RBC 5 63 (H) Million/uL      Hemoglobin 16 6 g/dL      Hematocrit 47 4 %      MCV 84 fL      MCH 29 5 pg      MCHC 35 0 g/dL      RDW 12 2 %      MPV 10 6 fL      Platelets 044 Thousands/uL      Neutrophils Relative 63 %      Lymphocytes Relative 28 %      Monocytes Relative 7 %      Eosinophils Relative 1 %      Basophils Relative 0 %      Neutrophils Absolute 4 91 Thousands/µL      Lymphocytes Absolute 2 19 Thousands/µL      Monocytes Absolute 0 56 Thousand/µL Eosinophils Absolute 0 10 Thousand/µL      Basophils Absolute 0 03 Thousands/µL                  No orders to display              Procedures  ECG 12 Lead Documentation  Date/Time: 7/5/2018 7:35 AM  Performed by: Beto Ivan by: Edith Hicks     ECG reviewed by me, the ED Provider: yes    Patient location:  ED  Previous ECG:     Previous ECG:  Compared to current    Comparison ECG info:  7 12 17    Similarity:  Changes noted  Interpretation:     Interpretation: abnormal    Rate:     ECG rate:  154    ECG rate assessment: tachycardic    Rhythm:     Rhythm: atrial fibrillation    Ectopy:     Ectopy: none    QRS:     QRS axis:  Normal    QRS intervals:  Normal  Conduction:     Conduction: normal    ST segments:     ST segments:  Non-specific  T waves:     T waves: non-specific             Phone Contacts  ED Phone Contact    ED Course  ED Course as of Jul 05 0900   Thu Jul 05, 2018   [address] [facility] Discussed caseWith on-call Cardiology, Dr Makenzie Lee , who is requesting that the morning team here at [facility] see the patient at 8:00 a m  Edith Hicks , requesting Re page at 8:00 a m     5931 Discussed case with Cardiology, Dr Guille Gould, , requesting not to electrically cardiovert, agrees that rate control with Cardizem, but is requesting eye contact electrophysiology did discuss the patient be better served at [facility] for possible ablation    2269 Discussed case with Dr Letitia Moser from Cardiology/electrophysiology  , recalls the patient very well , states patient is okay to be held here at [facility] as he will need rate control for a day, but does not need to be seen at [facility] as he would not get an ablation in the hospital   He has a follow-up appointment already scheduled with Dr Letitia Moser an 8 days  , they will discuss elective outpatient ablation at that time    9674 Patient heart rate significantly lowering with Cardizem  , heart rate now 80's -100's  , still in atrial fibrillation  MDM  Number of Diagnoses or Management Options  Atrial fibrillation with rapid ventricular response [facility]): new and requires workup  Paroxysmal atrial fibrillation [facility]):   Diagnosis management comments:       Initial ED assessment:  30-year-old male history of paroxysmally AFib presents with AFib with RVR  Initial ED plan: Will discussed case with Cardiology, reviewing patient's old charts, he had long pauses previously after conversion from AFib to normal sinus rhythm will discuss option of electrical cardioversion here in the ED with Cardiology versus placing on Cardizem drip        Final ED summary/disposition:   After evaluation and workup in the emergency department, after discussion multiple cardiologist decision was made to chemically rate control the patient and not to electrical cardiovert  Patient transfer to the floor on Cardizem drip, rate control with ventricular rate in 80s to 100s       Amount and/or Complexity of Data Reviewed  Clinical lab tests: ordered and reviewed  Review and summarize past medical records: yes  Discuss the patient with other providers: yes  Independent visualization of images, tracings, or specimens: yes      The patient presented with a condition in which there was a high probability of imminent or life-threatening deterioration, and critical care services (excluding separately billable procedures) totalled 30-74 minutes          Disposition  Final diagnoses:   Atrial fibrillation with rapid ventricular response (HCC)   Paroxysmal atrial fibrillation ([address] )     Time reflects when diagnosis was documented in both MDM as applicable and the Disposition within this note     Time User Action Codes Description Comment    7/5/2018  8:59 AM Tadeo Bailey Add [I48 91] Atrial fibrillation with rapid ventricular response ([address] )     7/5/2018  8:59 AM Ch[place] SIERRA Add [I48 0] Paroxysmal atrial fibrillation [facility])       ED Disposition ED Disposition Condition Comment    Admit  Case was discussed with Dr Luci Laguna and the patient's admission status was agreed to be Admission Status: inpatient status to the service of Dr Luci Laguna   Follow-up Information    None         Patient's Medications   Discharge Prescriptions    No medications on file     No discharge procedures on file      ED Provider  Electronically Signed by           Aylin Oro DO  07/05/18 0975

## 2018-07-05 NOTE — ASSESSMENT & PLAN NOTE
· Recurrence of rapid paroxysmal AFib which began this morning  · Intolerant to combination of flecainide and beta-blocker which previously caused him to have syncope and significant pauses  · Cardizem drip and add BB  · Consulted Cardiology--appreciate their input  If patient does not convert they will pursue JAYLEN cardioversion tomorrow  In the meantime they will add Xarelto for now     · He will continue to follow up with electrophysiology as an outpatient, and is likely a candidate for ablation  · Continue tele

## 2018-07-05 NOTE — H&P
H&P- Hazel Dasilva 1985, 34 y o  male MRN: [id number]    Unit/Bed#: -01 Encounter: [id number]    Primary Care Provider: Ermias Velasco DO   Date and time admitted to hospital: 7/5/2018  7:17 AM  * Atrial fibrillation with rapid ventricular response (HCC)   Assessment & Plan    · Recurrence of rapid paroxysmal AFib which began this morning  · Intolerant to combination of flecainide and beta-blocker which previously caused him to have syncope and significant pauses  · Cardizem drip and add BB  · Consulted Cardiology--appreciate their input  If patient does not convert they will pursue JAYLEN cardioversion tomorrow  In the meantime they will add Xarelto for now  · He will continue to follow up with electrophysiology as an outpatient, and is likely a candidate for ablation  · Continue tele          VTE Prophylaxis: Rivaroxaban (Xarelto)  / reason for no mechanical VTE prophylaxis low risk   Code Status: full  POLST: POLST is not applicable to this patient  Discussion with family:     Anticipated Length of Stay:  Patient will be admitted on an Observation basis with an anticipated length of stay of less than  2 midnights  Justification for Hospital Stay: rapid afib, cardioversion    Total Time for Visit, including Counseling / Coordination of Care: 45 minutes  Greater than 50% of this total time spent on direct patient counseling and coordination of care  Chief Complaint:   Went back into AFib    History of Present Illness:    Hazel aDsilva is a 34 y o  male who presents with recurrence of AFib  He states that in the middle of the night he felt that something might be off but he was able to keep sleeping until he got up this morning and knew that something was not right  He could tell that he was back in AFib when he ambulated to the bathroom and had lightheadedness and palpitations    Patient does report that overall this episode of AFib is less intense than other episodes and he denies any associated shortness of breath or chest pain  Patient has prior history of AFib and follows with electrophysiology  He has a loop recorder in place  There had been previous discussion about possible ablation in the future  He has not had any events of AFib that he is aware of since last summer and therefore no ablation had yet occurred  Last summer however when patient was given flecainide and beta-blocker together he had syncope with significant cardiac pauses up to 19 seconds noted  He was given a prescription of beta-blocker alone to try to use if he feels himself going back into AFib but he did not utilize this prior to coming back to the hospital today  On 4th of July he had only a very small amount of wine and denies any increase in his usual caffeine intake of 1 cup per day  Review of Systems:    Review of Systems   Constitutional: Negative for activity change, appetite change, chills, diaphoresis, fatigue, fever and unexpected weight change  Respiratory: Negative for chest tightness and shortness of breath  Cardiovascular: Positive for palpitations  Negative for chest pain and leg swelling  Gastrointestinal: Negative for abdominal pain, diarrhea, nausea and vomiting  Genitourinary: Negative for decreased urine volume and difficulty urinating  Musculoskeletal: Negative for gait problem  Skin: Negative for color change, pallor, rash and wound  Neurological: Positive for light-headedness  Negative for dizziness, tremors, syncope, weakness, numbness and headaches  Psychiatric/Behavioral: The patient is not nervous/anxious        Past Medical and Surgical History:     Past Medical History:   Diagnosis Date    Atrial fibrillation ([address] )     Palpitations     Pericarditis 12/2012    Admission to [facility] at that time    Status post placement of implantable loop recorder        Past Surgical History:   Procedure Laterality Date    ADENOIDECTOMY         Meds/Allergies:    Prior to Admission medications    Medication Sig Start Date End Date Taking? Authorizing Provider   Multiple Vitamin (MULTIVITAMIN) capsule Take 1 capsule by mouth daily    Historical Provider, MD   metoprolol tartrate (LOPRESSOR) 25 mg tablet Take 25 mg by mouth daily as needed    7/5/18  Historical Provider, MD     I have reviewed home medications with patient personally  Allergies: No Known Allergies    Social History:     Marital Status: /Civil Union   Occupation: with wife  Patient Pre-hospital Living Situation:   Patient Pre-hospital Level of Mobility:   Patient Pre-hospital Diet Restrictions:   Substance Use History:   History   Alcohol Use No     History   Smoking Status    Former Smoker   Smokeless Tobacco    Never Used     History   Drug Use No       Family History:    non-contributory    Physical Exam:     Vitals:   Blood Pressure: 105/68 (07/05/18 1326)  Pulse: 94 (07/05/18 1326)  Temperature: 98 5 °F (36 9 °C) (07/05/18 0944)  Temp Source: Oral (07/05/18 0944)  Respirations: 20 (07/05/18 0944)  Height: 5' 9" (175 3 cm) (07/05/18 0944)  Weight - Scale: 89 kg (196 lb 3 4 oz) (07/05/18 0944)  SpO2: 98 % (07/05/18 0944)    Physical Exam   Constitutional: He appears well-developed and well-nourished  No distress  HENT:   Head: Normocephalic and atraumatic  Mouth/Throat: No oropharyngeal exudate  Eyes: Conjunctivae are normal  Right eye exhibits no discharge  Left eye exhibits no discharge  No scleral icterus  Neck: Neck supple  Cardiovascular: Intact distal pulses  No murmur heard  Rapid afib   Pulmonary/Chest: Effort normal and breath sounds normal  No respiratory distress  He has no wheezes  He has no rales  Abdominal: Soft  He exhibits no distension  There is no tenderness  Musculoskeletal: He exhibits no edema  Neurological: He is alert  Awake alert and interactive with no focal deficits   Skin: Skin is warm and dry  No rash noted  He is not diaphoretic  No erythema  No pallor  Psychiatric: He has a normal mood and affect  His behavior is normal  Judgment and thought content normal    Vitals reviewed  Additional Data:     Lab Results: I have personally reviewed pertinent reports  Results from last 7 days  Lab Units 07/05/18  0735   WBC Thousand/uL 7 79   HEMOGLOBIN g/dL 16 6   HEMATOCRIT % 47 4   PLATELETS Thousands/uL 212   NEUTROS PCT % 63   LYMPHS PCT % 28   MONOS PCT % 7   EOS PCT % 1       Results from last 7 days  Lab Units 07/05/18  0735   SODIUM mmol/L 141   POTASSIUM mmol/L 4 1   CHLORIDE mmol/L 103   CO2 mmol/L 32   BUN mg/dL 20   CREATININE mg/dL 0 74   CALCIUM mg/dL 9 1   GLUCOSE RANDOM mg/dL 92                 Imaging: I have personally reviewed pertinent reports  No orders to display       EKG, Pathology, and Other Studies Reviewed on Admission:   · EKG: afib    Allscripts / Epic Records Reviewed: Yes     ** Please Note: This note has been constructed using a voice recognition system   **

## 2018-07-06 VITALS
SYSTOLIC BLOOD PRESSURE: 107 MMHG | WEIGHT: 196.21 LBS | HEIGHT: 69 IN | TEMPERATURE: 98 F | DIASTOLIC BLOOD PRESSURE: 55 MMHG | BODY MASS INDEX: 29.06 KG/M2 | HEART RATE: 58 BPM | OXYGEN SATURATION: 100 % | RESPIRATION RATE: 20 BRPM

## 2018-07-06 PROCEDURE — 99217 PR OBSERVATION CARE DISCHARGE MANAGEMENT: CPT | Performed by: PHYSICIAN ASSISTANT

## 2018-07-06 PROCEDURE — 99213 OFFICE O/P EST LOW 20 MIN: CPT | Performed by: INTERNAL MEDICINE

## 2018-07-06 RX ORDER — METOPROLOL SUCCINATE 25 MG/1
25 TABLET, EXTENDED RELEASE ORAL DAILY
Qty: 30 TABLET | Refills: 0 | Status: SHIPPED | OUTPATIENT
Start: 2018-07-06 | End: 2018-08-15 | Stop reason: SDUPTHER

## 2018-07-06 RX ADMIN — METOPROLOL TARTRATE 25 MG: 25 TABLET ORAL at 08:08

## 2018-07-06 NOTE — PLAN OF CARE
Problem: DISCHARGE PLANNING - CARE MANAGEMENT  Goal: Discharge to post-acute care or home with appropriate resources  INTERVENTIONS:  - Conduct assessment to determine patient/family and health care team treatment goals, and need for post-acute services based on payer coverage, community resources, and patient preferences, and barriers to discharge  - Address psychosocial, clinical, and financial barriers to discharge as identified in assessment in conjunction with the patient/family and health care team  - Arrange appropriate level of post-acute services according to patients   needs and preference and payer coverage in collaboration with the physician and health care team  - Communicate with and update the patient/family, physician, and health care team regarding progress on the discharge plan  - Arrange appropriate transportation to post-acute venues  Outcome: Progressing  CM met with patient at bedside, patient alert and oriented  OBS status explained to patient, patient signed OBS form, copy given to patient and copy sent to medical records for scanning  CM will continue to assess for needs and will follow through discharge

## 2018-07-06 NOTE — PROGRESS NOTES
Cardiology Progress Note - Augie Monroy 34 y o  male MRN: [id number]    Unit/Bed#: -01 Encounter: [id number]        Subjective:    Patient converted to NSR  Review of Systems   Cardiovascular: Negative for chest pain, leg swelling and palpitations  Respiratory: Negative for shortness of breath  Objective:   Vitals: Blood pressure 107/55, pulse 58, temperature 98 °F (36 7 °C), temperature source Oral, resp  rate 20, height 5' 9" (1 753 m), weight 89 kg (196 lb 3 4 oz), SpO2 100 %  , Body mass index is 28 98 kg/m² , Orthostatic Blood Pressures      Most Recent Value   Blood Pressure  107/55 filed at 07/06/2018 0720   Patient Position - Orthostatic VS  Lying filed at 07/06/2018 3946         Systolic (34FEL), NFQ:750 , Min:99 , FDJ:389     Diastolic (63VMK), LPN:50, Min:53, Max:68      Intake/Output Summary (Last 24 hours) at 07/06/18 0830  Last data filed at 07/05/18 1700   Gross per 24 hour   Intake              240 ml   Output             2100 ml   Net            -1860 ml     Weight (last 2 days)     Date/Time   Weight    07/05/18 0944  89 (196 21)    07/05/18 0720  85 9 (189 38)                  Telemetry Review: NSR  Physical Exam   Cardiovascular: Normal rate, regular rhythm and normal heart sounds  Exam reveals no gallop and no friction rub  No murmur heard  Pulmonary/Chest: Breath sounds normal  He has no wheezes  He has no rales  Musculoskeletal: He exhibits no edema           Laboratory Results:        CBC with diff:   Results from last 7 days  Lab Units 07/05/18  0735   WBC Thousand/uL 7 79   HEMOGLOBIN g/dL 16 6   HEMATOCRIT % 47 4   MCV fL 84   PLATELETS Thousands/uL 212   MCH pg 29 5   MCHC g/dL 35 0   RDW % 12 2   MPV fL 10 6         CMP:  Results from last 7 days  Lab Units 07/05/18  0735   SODIUM mmol/L 141   POTASSIUM mmol/L 4 1   CHLORIDE mmol/L 103   CO2 mmol/L 32   ANION GAP mmol/L 6   BUN mg/dL 20   CREATININE mg/dL 0 74   GLUCOSE RANDOM mg/dL 92   CALCIUM mg/dL 9 1   EGFR ml/min/1 73sq m 123         BMP:  Results from last 7 days  Lab Units 18  0735   SODIUM mmol/L 141   POTASSIUM mmol/L 4 1   CHLORIDE mmol/L 103   CO2 mmol/L 32   BUN mg/dL 20   CREATININE mg/dL 0 74   GLUCOSE RANDOM mg/dL 92   CALCIUM mg/dL 9 1       Cardiac testing:   Results for orders placed during the hospital encounter of 16   Echo complete with contrast if indicated    Narrative [place] [address], [address] [facility]  [phone]    Transthoracic Echocardiogram  2D, M-mode, Doppler, and Color Doppler    Study date:  2016    Patient: Ainsley Rae  MR number: [id number]  Account number: [de-identified]  : 1985  Age: 31 years  Gender: Male  Status: Outpatient  Location: [address] [place] [address]  Height: 70 in  Weight: 189 6 lb  BP: 108/ 64 mmHg    Indications: Atrial fibrillation  Diagnoses: I48 0 - Atrial fibrillation    Sonographer:  BELL Estrada  Referring Physician:  Fabio Carney DO  Group:  [facility]  cc:  Jeff Jain MD  Interpreting Physician:  Deuce Alcazar MD    SUMMARY    LEFT VENTRICLE:  Size was normal   Systolic function was normal  Ejection fraction was estimated to be 55 %  Wall thickness was normal   Left ventricular diastolic function parameters were normal     RIGHT VENTRICLE:  The size was normal   Systolic function was normal     TRICUSPID VALVE:  There was trace regurgitation  HISTORY: PRIOR HISTORY: Atrial Fibrillation, Chest Pain, Pericarditis,    PROCEDURE: The study was performed in the [address] [place] [address]  This was a routine study  The transthoracic approach was used  The study  included complete 2D imaging, M-mode, complete spectral Doppler, and color  Doppler  The heart rate was 60 bpm, at the start of the study  Images were  obtained from the parasternal, apical, subcostal, and suprasternal notch  acoustic windows  Image quality was good      LEFT VENTRICLE: Size was normal  Systolic function was normal  Ejection  fraction was estimated to be 55 %  There were no regional wall motion  abnormalities  Wall thickness was normal  DOPPLER: Left ventricular diastolic  function parameters were normal     RIGHT VENTRICLE: The size was normal  Systolic function was normal  Wall  thickness was normal     LEFT ATRIUM: Size was normal     RIGHT ATRIUM: Size was normal     MITRAL VALVE: Valve structure was normal  There was normal leaflet separation  DOPPLER: The transmitral velocity was within the normal range  There was no  evidence for stenosis  There was no regurgitation  AORTIC VALVE: The valve was trileaflet  Leaflets exhibited normal thickness and  normal cuspal separation  DOPPLER: Transaortic velocity was within the normal  range  There was no evidence for stenosis  There was no regurgitation  TRICUSPID VALVE: The valve structure was normal  There was normal leaflet  separation  DOPPLER: The transtricuspid velocity was within the normal range  There was no evidence for stenosis  There was trace regurgitation  The  tricuspid jet envelope definition was inadequate for estimation of RV systolic  pressure  There are no indirect findings suggestive of moderate or severe  pulmonary hypertension  PULMONIC VALVE: Leaflets exhibited normal thickness, no calcification, and  normal cuspal separation  DOPPLER: The transpulmonic velocity was within the  normal range  There was no regurgitation  PERICARDIUM: There was no pericardial effusion  The pericardium was normal in  appearance  AORTA: The root exhibited normal size  SYSTEMIC VEINS: IVC: The inferior vena cava was normal in size and course    Respirophasic changes were normal     SYSTEM MEASUREMENT TABLES    2D  %FS: 30 56 %  AV Diam: 3 16 cm  EDV(Teich): 114 31 ml  EF Biplane: 50 74 %  EF(Cube): 66 52 %  EF(Teich): 57 86 %  ESV(Cube): 40 06 ml  ESV(Teich): 48 17 ml  IVSd: 0 88 cm  LA Area: 19 cm2  LA Diam: 3 8 cm  LVEDV MOD A2C: 138 49 ml  LVEDV MOD A4C: 93 85 ml  LVEDV MOD BP: 119 51 ml  LVEF MOD A2C: 49 47 %  LVEF MOD A4C: 53 87 %  LVESV MOD A2C: 69 98 ml  LVESV MOD A4C: 43 29 ml  LVESV MOD BP: 58 87 ml  LVIDd: 4 93 cm  LVIDs: 3 42 cm  LVLd A2C: 10 02 cm  LVLd A4C: 9 1 cm  LVLs A2C: 8 5 cm  LVLs A4C: 7 37 cm  LVPWd: 0 89 cm  RA Area: 18 12 cm2  RV Diam: 3 64 cm  SI(Cube): 39 02 ml/m2  SI(Teich): 32 42 ml/m2  SV MOD A2C: 68 51 ml  SV MOD A4C: 50 56 ml  SV(Cube): 79 6 ml  SV(Teich): 66 14 ml    MM  TAPSE: 2 3 cm    PW  E': 0 1 m/s  E/E': 8 56  MV A Humberto: 0 54 m/s  MV Dec [place]: 5 58 m/s2  MV DecT: 148 04 ms  MV E Humberto: 0 83 m/s  MV E/A Ratio: 1 52    [facility]    Prepared and electronically signed by    Luisito Mar MD  Signed 20-Jan-2016 15:16:47         Meds/Allergies     Current Facility-Administered Medications:  metoprolol tartrate 25 mg Oral Q12H [address] Luisito Mar MD   ondansetron 4 mg Intravenous Q4H PRN Chanel Saavedra PA-C   rivaroxaban 20 mg Oral Daily With Verner Rua, MD        Prescriptions Prior to Admission   Medication    Multiple Vitamin (MULTIVITAMIN) capsule       Assessment:  Principal Problem:    Atrial fibrillation with rapid ventricular response (HCC)      Impression:  1  Paroxysmal atrial fibrillation - converted to NSR  Plan:  1  Discharge on Metoprolol Succinate 25mg daily  2  Follow up with Dr Heladio Gama on 7/13

## 2018-07-06 NOTE — DISCHARGE SUMMARY
Discharge- Dwayne Hou 1985, 34 y o  male MRN: [id number]    Unit/Bed#: -01 Encounter: [id number]    Primary Care Provider: Cesar Vargas DO   Date and time admitted to hospital: 7/5/2018  7:17 AM  * Atrial fibrillation with rapid ventricular response ([facility] )   Assessment & Plan    · Recurrence of rapid paroxysmal AFib, symptomatic-- now resolved  · Intolerant to combination of flecainide and beta-blocker which previously caused him to have syncope and significant pauses  · Cardizem drip and BB provided, now in NSR--continue Toprol XL 25 mg daily at discharge per [facility]; did not require JAYLEN CV  · He will continue to follow up with electrophysiology as an outpatient, and is likely a candidate for ablation            Discharging Physician / Practitioner: Hector Venegas PA-C  PCP: Cesar Vargas DO  Admission Date:   Admission Orders     Ordered        07/05/18 1327  Place in Observation  Once         07/05/18 0859  Inpatient Admission (expected length of stay for this patient is greater than two midnights)  Once             Discharge Date: 07/06/18    Resolved Problems  Date Reviewed: 7/6/2018    None        Consultations During Hospital Stay:  · [facility]    Procedures Performed:     · none    Significant Findings / Test Results:     · none    Incidental Findings:   · none     Test Results Pending at Discharge (will require follow up):   · none     Outpatient Tests Requested:  · none    Complications:  none    Reason for Admission: [place] [address]  Course:     Dwayne Hou is a 34 y o  male patient who originally presented to the hospital on 7/5/2018 due to recurrence of AFib  Patient follows with electrophysiology as an outpatient and immediately recognized when he went back into AFib  He was experiencing lightheadedness and palpitations  Upon arrival to the emergency department he was in rapid AFib with rates in the 150s  He was placed on a Cardizem drip and seen in consultation by Cardiology    In addition, oral beta-blocker was added  He converted back into normal sinus rhythm without requiring JAYLEN cardioversion and is stable to be discharged today on Toprol XL 25 mg daily per Cardiology  He will follow up with electrophysiology next week  Please see above list of diagnoses and related plan for additional information  Condition at Discharge: stable     Discharge Day Visit / Exam:     Subjective:  Patient reports he feels fine and offers no complaints of chest pain palpitations, shortness of breath, or recurrence of AFib  Vitals: Blood Pressure: 107/55 (07/06/18 0720)  Pulse: 58 (07/06/18 0720)  Temperature: 98 °F (36 7 °C) (07/06/18 0720)  Temp Source: Oral (07/06/18 0720)  Respirations: 20 (07/06/18 0720)  Height: 5' 9" (175 3 cm) (07/05/18 0944)  Weight - Scale: 89 kg (196 lb 3 4 oz) (07/05/18 0944)  SpO2: 100 % (07/06/18 0720)  Exam:   Physical Exam   Constitutional: He appears well-developed and well-nourished  No distress  HENT:   Head: Normocephalic and atraumatic  Mouth/Throat: No oropharyngeal exudate  Eyes: Conjunctivae are normal  Right eye exhibits no discharge  Left eye exhibits no discharge  No scleral icterus  Neck: Neck supple  Cardiovascular: Normal rate, regular rhythm and normal heart sounds  No murmur heard  Pulmonary/Chest: Effort normal and breath sounds normal  No respiratory distress  He has no wheezes  He has no rales  Abdominal: Soft  Bowel sounds are normal  He exhibits no distension  There is no tenderness  There is no rebound  Musculoskeletal: He exhibits no edema  Neurological: He is alert  Awake alert and interactive   Skin: Skin is warm and dry  No rash noted  He is not diaphoretic  No erythema  Psychiatric: He has a normal mood and affect  His behavior is normal    Nursing note and vitals reviewed      Discussion with Family: declined    Discharge instructions/Information to patient and family:   See after visit summary for information provided to patient and family  Provisions for Follow-Up Care:  See after visit summary for information related to follow-up care and any pertinent home health orders  Telemetry reviewed    Disposition:     Home    For Discharges to [facility] SNF:   · Not Applicable to this Patient - Not Applicable to this Patient    Planned Readmission: none     Discharge Statement:  I spent 30 minutes discharging the patient  This time was spent on the day of discharge  I had direct contact with the patient on the day of discharge  Greater than 50% of the total time was spent examining patient, answering all patient questions, arranging and discussing plan of care with patient as well as directly providing post-discharge instructions  Additional time then spent on discharge activities  Discharge Medications:  See after visit summary for reconciled discharge medications provided to patient and family        ** Please Note: This note has been constructed using a voice recognition system **

## 2018-07-06 NOTE — ASSESSMENT & PLAN NOTE
· Recurrence of rapid paroxysmal AFib, symptomatic-- now resolved  · Intolerant to combination of flecainide and beta-blocker which previously caused him to have syncope and significant pauses  · Cardizem drip and BB provided, now in NSR--continue Toprol XL 25 mg daily at discharge per [facility]; did not require JAYLEN CV  · He will continue to follow up with electrophysiology as an outpatient, and is likely a candidate for ablation

## 2018-07-06 NOTE — SOCIAL WORK
CM met with patient at bedside, patient alert and oriented  OBS status explained to patient, patient signed OBS form, copy given to patient and copy sent to medical records for scanning  CM will continue to assess for needs and will follow through discharge

## 2018-07-06 NOTE — CASE MANAGEMENT
Thank you,  Clayton Aqq  [facility] Utilization Review Department  Phone: [phone]; Fax [phone]  ATTENTION: The Network Utilization Review Department is now centralized for our 9 Facilities  Make a note that we have a new phone and fax numbers for our Department  Please call with any questions or concerns to [phone] and carefully follow the prompts so that you are directed to the right person  All voicemails are confidential  Fax any determinations, approvals, denials, and requests for initial or continue stay review clinical to [phone]  Due to HIGH CALL volume, it would be easier if you could please send faxed requests to expedite your requests and in part, help us provide discharge notifications faster  Initial Clinical Review    Admission: Date/Time/Statement: Observation Admission 7/5/18 @ 1327    Orders Placed This Encounter   Procedures    Inpatient Admission (expected length of stay for this patient is greater than two midnights)     Standing Status:   Standing     Number of Occurrences:   1     Order Specific Question:   Admitting Physician     Answer:   Geneva Lin [[id number]]     Order Specific Question:   Level of Care     Answer:   Med Surg [16]     Order Specific Question:   Estimated length of stay     Answer:   More than 2 Midnights     Order Specific Question:   Certification     Answer:   I certify that inpatient services are medically necessary for this patient for a duration of greater than two midnights  See H&P and MD Progress Notes for additional information about the patient's course of treatment      Place in Observation     Standing Status:   Standing     Number of Occurrences:   1     Order Specific Question:   Admitting Physician     Answer:   Raul Valdivia     Order Specific Question:   Level of Care     Answer:   Med Surg [16]         ED: Date/Time/Mode of Arrival:   ED Arrival Information     Expected Arrival [address] [place] of Arrival Escorted By Service Admission Type    - 7/5/2018 07:16 Emergent Walk-In Family Member General Medicine Emergency    Arrival Complaint    afib          Chief Complaint:   Chief Complaint   Patient presents with    Rapid Heart Rate     Pt states that he woke up this morning and felt like he was in a-fib  History of Illness: 34 y o  Male with recurrence of AFib, woke during night but was able to keep sleeping, woke in morning ambulated to bathroom feeling lightheadedness & palpitations  ED Vital Signs:   ED Triage Vitals   Temperature Pulse Respirations Blood Pressure SpO2   07/05/18 0920 07/05/18 0720 07/05/18 0720 07/05/18 0720 07/05/18 0720   98 2 °F (36 8 °C) 64 16 135/68 100 %      Temp Source Heart Rate Source Patient Position - Orthostatic VS BP Location FiO2 (%)   07/05/18 0920 07/05/18 0720 07/05/18 0720 07/05/18 0720 --   Oral Monitor Lying Right arm       Pain Score       07/05/18 0720       No Pain        Wt Readings from Last 1 Encounters:   07/05/18 89 kg (196 lb 3 4 oz)       Vital Signs (abnormal): 07/05/2018 , 154, 154    Abnormal Labs/Diagnostic Test Results: 07/05/2018: 5 63  EKG: Atrial fibrillation with rapid ventricular response with premature ventricular or aberrantly conducted complexes  Abnormal ECG      ED Treatment:   Medication Administration from 07/05/2018 0716 to 07/05/2018 0944       Date/Time Order Dose Route Action Action by Comments     07/05/2018 0839 diltiazem (CARDIZEM) injection 15 mg 15 mg Intravenous Given Danya Gomez RN      07/05/2018 0839 diltiazem (CARDIZEM) 125 mg in sodium chloride 0 9 % 125 mL infusion 10 mg/hr Intravenous New Bag Danya Gomez RN           Past Medical/Surgical History:    Active Ambulatory Problems     Diagnosis Date Noted    [facility] 01/17/2016    Syncope 06/26/2017    Paroxysmal [facility] fibrillation ([facility] ) 06/26/2017     Resolved Ambulatory Problems     Diagnosis Date Noted    Palpitations 03/19/2017     Past Medical History:   Diagnosis Date    [facility]     Palpitations     Pericarditis 12/2012    Status post placement of implantable loop recorder        Admitting Diagnosis: Paroxysmal atrial fibrillation (HCC) [I48 0]  A-fib (HCC) [I48 91]  Atrial fibrillation with rapid ventricular response ([facility] ) [I48 91]    Age/Sex: 34 y o  male    Assessment/Plan:   Atrial fibrillation with rapid ventricular response (HCC)   Assessment & Plan     · Recurrence of rapid paroxysmal AFib which began this morning  · Intolerant to combination of flecainide and beta-blocker which previously caused him to have syncope and significant pauses  · Cardizem drip and add BB  · Consulted Cardiology--appreciate their input  If patient does not convert they will pursue JAYLEN cardioversion tomorrow  In the meantime they will add Xarelto for now  · He will continue to follow up with electrophysiology as an outpatient, and is likely a candidate for ablation  · Continue tele         VTE Prophylaxis: Rivaroxaban (Xarelto)  / reason for no mechanical VTE prophylaxis low risk   Code Status: full      Admission Orders:  Scheduled Meds:   Current Facility-Administered Medications:  metoprolol tartrate 25 mg Oral Q12H [address]   ondansetron 4 mg Intravenous Q4H PRN   rivaroxaban 20 mg Oral Daily With Dinner     Continuous Infusions:    PRN Meds: ondansetron  Vitals routine  JAYLEN  Cardiac cardioversion  48 hr tele  Cardiology Consult      Cardiology Consult:  Assessment:  Principal Problem:    Atrial fibrillation with rapid ventricular response (HCC)        Impression:  1  Paroxysmal atrial fibrillation with RVR - on diltiazem gtt  Will start anticoagulation temporarily, and b-blockers  If converts to NSR, will discharge on b-blockers  If does not, will proceed with JAYLEN/DCCV tomorrow      Plan:  1  Start metoprolol tartrate 25mg BID    2  Start Xarelto 20mg daily - although CHADS2 score 0, will anticoagulate given cardioversion  3  Continue diltiazem gtt for now    4  If remains in atrial fibrillation tomorrow, will proceed with cardioversion

## 2018-07-13 ENCOUNTER — OFFICE VISIT (OUTPATIENT)
Dept: CARDIOLOGY CLINIC | Facility: CLINIC | Age: 33
End: 2018-07-13
Payer: COMMERCIAL

## 2018-07-13 VITALS
WEIGHT: 200.1 LBS | HEIGHT: 69 IN | BODY MASS INDEX: 29.64 KG/M2 | HEART RATE: 80 BPM | OXYGEN SATURATION: 98 % | SYSTOLIC BLOOD PRESSURE: 132 MMHG | DIASTOLIC BLOOD PRESSURE: 70 MMHG

## 2018-07-13 VITALS
OXYGEN SATURATION: 97 % | HEART RATE: 74 BPM | HEIGHT: 69 IN | WEIGHT: 199.4 LBS | BODY MASS INDEX: 29.53 KG/M2 | DIASTOLIC BLOOD PRESSURE: 84 MMHG | SYSTOLIC BLOOD PRESSURE: 136 MMHG

## 2018-07-13 DIAGNOSIS — I48.91 ATRIAL FIBRILLATION WITH RAPID VENTRICULAR RESPONSE (HCC): Primary | ICD-10-CM

## 2018-07-13 DIAGNOSIS — I48.91 ATRIAL FIBRILLATION, UNSPECIFIED TYPE (HCC): Primary | ICD-10-CM

## 2018-07-13 PROCEDURE — 93000 ELECTROCARDIOGRAM COMPLETE: CPT | Performed by: INTERNAL MEDICINE

## 2018-07-13 PROCEDURE — 99214 OFFICE O/P EST MOD 30 MIN: CPT | Performed by: INTERNAL MEDICINE

## 2018-07-13 NOTE — PROGRESS NOTES
[facility] [address]     Outpatient  Follow-up  Today's Date: 07/13/18        Patient name: Augie Monroy  YOB: 1985  Sex: male         Chief Complaint: f/u afib      ASSESSMENT:  Problem List Items Addressed This Visit     None      Visit Diagnoses     Atrial fibrillation, unspecified type [facility])    -  Primary          33 yo male  1) Afib w/ RVR Last episode was July 6th 2018, converted on his own in hospital, was placed on metoprolol and does have fatigue from that  Before that had episode a year ago and had 19s pause on Flecaindie oop recorder confirmed mutliple pauses 6s,7s, 19s Pause post conversion  Was bradycardic 40bpm after that in ER  For a couple weeks after that had a few dizzy spells and headaches, felt unwell, but all has resolved  Before that had episode on 3/19/17 and before that was 1/17/16   SOB  [id number]    2) Cardiac MRI 2012 was normal, no structural heart disease but he did have pericarditis in 2012  3) Syncope x3 previously vagal mediated        PLAN:  1  Recommend Cryoballoon ablation of afib  INFORMED CONSENT: Risks, benefits, and alternatives to atrial fibrillation ablation with possibly a combination of cryoballoon and radiofrequency ablation, and associated procedures such as transesophageal echocardiogram and atrial flutter ablation discussed  Alternative treatment with medical management has been tried and/or discussed  Usual pre and post operative expectations were discussed  Estimated complication rate is <6%  Atrial fibrillation ablation was explained to be a treatment that reduces burden of atrial fibrillation but is not a cure and medications and repeat ablations are often required  Although most patients derive improvement in symptoms and burden in atrial fibrillation, there are some patients that have not improved following this procedure    Blood thinners will not be discontinued immediately after ablation and may be required long term regardless of result  The patient understood risks include but not limited to death, esophageal injury, phrenic nerve injury (diaphragm), stroke, bleeding and vascular complication, bleeding around the heart and open heart surgery  2  Cardiac MRI preop for PV anatomy    3  Start PRadaxa 10 days prior to ablation, samples given, hold morning of procedure  Will continue 3 months post ablation    4  No JAYLEN needed given CHADS 0 and in NSR            HPI/Subjective:   33 yo male  1) Afib w/ RVR Last episode was July 6th 2018, converted on his own in hospital, was placed on metoprolol and does have fatigue from that  Before that had episode a year ago and had 19s pause on Flecaindie oop recorder confirmed mutliple pauses 6s,7s, 19s Pause post conversion  Was bradycardic 40bpm after that in ER  For a couple weeks after that had a few dizzy spells and headaches, felt unwell, but all has resolved  Before that had episode on 3/19/17 and before that was 1/17/16   SOB  [id number]    2) Cardiac MRI 2012 was normal, no structural heart disease but he did have pericarditis in 2012  3) Syncope x3 previously vagal mediated    Please note HPI is listed by problem with with update following it, it is copied again in the assessment above and reflects medical decision making as well  Complete 12 point ROS reviewed and otherwise non pertinent or negative except as per HPI pertinent positives in Cardiovascular and Respiratory emphasized  Please see paper chart for outpatient clinic patients where the patient completed the 12 point ROS survey             Past Medical History:   Diagnosis Date    Atrial fibrillation ([address] )     Palpitations     Pericarditis 12/2012    Admission to [facility] at that time    Status post placement of implantable loop recorder No Known Allergies  I reviewed the Home Medication list and Allergies in the chart  Scheduled Meds:  Current Outpatient Prescriptions   Medication Sig Dispense Refill    metoprolol succinate (TOPROL-XL) 25 mg 24 hr tablet Take 1 tablet (25 mg total) by mouth daily 30 tablet 0    Multiple Vitamin (MULTIVITAMIN) capsule Take 1 capsule by mouth daily       No current facility-administered medications for this visit  PRN Meds:         Family History   Problem Relation Age of Onset    Atrial fibrillation Father     Hypertension Father     Heart attack Neg Hx     Stroke Neg Hx     Anuerysm Neg Hx     Clotting disorder Neg Hx     Hyperlipidemia Neg Hx     Heart failure Neg Hx        Social History     Social History    Marital status: /Civil Union     Spouse name: N/A    Number of children: N/A    Years of education: N/A     Occupational History    Not on file  Social History Main Topics    Smoking status: Former Smoker    Smokeless tobacco: Never Used    Alcohol use No    Drug use: No    Sexual activity: Yes     Other Topics Concern    Not on file     Social History Narrative    No narrative on file         OBJECTIVE:    /84 (BP Location: Left arm, Patient Position: Sitting, Cuff Size: Adult)   Pulse 74   Ht 5' 9" (1 753 m)   Wt 90 4 kg (199 lb 6 4 oz)   SpO2 97%   BMI 29 45 kg/m²   Vitals:    07/13/18 1505   Weight: 90 4 kg (199 lb 6 4 oz)     GEN: No acute distress, Alert and oriented, well appearing  HEENT:Head, neck, ears, oral pharynx: Mucus membranes moist, oral pharynx clear, nares clear   External ears normal  EYES: Pupils equal, sclera anicteric, midline, normal conjuctiva  NECK: No JVD, supple, no obvious masses or thryomegaly or goiter  CARDIOVASCULAR:  RRR, No murmur, rub, gallops S1,S2  LUNGS: Clear To auscultation bilaterally, normal effort, no rales, rhonchi, crackles  ABDOMEN:  nondistended,  without obvious organomegaly or ascites  EXTREMITIES/VASCULAR:  No edema  Radial pulses intact, pedal pulses difficult to palpate, warm an well perfused  PSYCH: Normal Affect, no overt suicidal ideation, linear speech pattern without evidence of psychosis  NEURO: Grossly intact, moving all extremiteis equal, face symmetric, alert and responsive, no obvious focal defecits  GAIT:  Ambulates normally without difficulty  HEME: No bleeding, bruising, petechia, purpura  SKIN: No significant rashes, warm, no diaphoresis or pallor  Lab Results:       LABS:      Chemistry        Component Value Date/Time     2018 0735     2016 0040    K 4 1 2018 0735    K 3 0 (L) 2016 0040     2018 0735     2016 0040    CO2 32 2018 0735    CO2 26 2016 0040    BUN 20 2018 0735    BUN 13 2016 0040    CREATININE 0 74 2018 0735    CREATININE 0 82 2016 0040        Component Value Date/Time    CALCIUM 9 1 2018 0735    CALCIUM 8 7 2016 0040    ALKPHOS 78 2017 1748    ALKPHOS 49 2016 0040    AST 13 2017 1748    AST 23 2016 0040    ALT 30 2017 1748    ALT 33 2016 0040    BILITOT 0 40 2017 1748    BILITOT 0 65 2016 0040            No results found for: CHOL  No results found for: HDL  No results found for: LDLCALC  No results found for: TRIG  No components found for: CHOLHDL    IMAGING: No results found       Cardiac testing:   Results for orders placed during the hospital encounter of 16   Echo complete with contrast if indicated    Narrative [facility], [address]  [phone]    Transthoracic Echocardiogram  2D, M-mode, Doppler, and Color Doppler    Study date:  2016    Patient: Kirk Hillman  MR number: [id number]  Account number: [de-identified]  : 1985  Age: 31 years  Gender: Male  Status: Outpatient  [facility]: [address] [facility]  Height: 70 in  Weight: 189 6 lb  BP: 108/ 64 mmHg    Indications: Atrial fibrillation  Diagnoses: I48 0 - Atrial fibrillation    Sonographer:  BELL Gonzalez  Referring Physician:  Kalia Carbone DO  Group:  [facility]  cc:  Shara Tai MD  Interpreting Physician:  Trina Spivey MD    SUMMARY    LEFT VENTRICLE:  Size was normal   Systolic function was normal  Ejection fraction was estimated to be 55 %  Wall thickness was normal   Left ventricular diastolic function parameters were normal     RIGHT VENTRICLE:  The size was normal   Systolic function was normal     TRICUSPID VALVE:  There was trace regurgitation  HISTORY: PRIOR HISTORY: Atrial Fibrillation, Chest Pain, Pericarditis,    PROCEDURE: The study was performed in the [facility]  This was a routine study  The transthoracic approach was used  The study  included complete 2D imaging, M-mode, complete spectral Doppler, and color  Doppler  The heart rate was 60 bpm, at the start of the study  Images were  obtained from the parasternal, apical, subcostal, and suprasternal notch  acoustic windows  Image quality was good  LEFT VENTRICLE: Size was normal  Systolic function was normal  Ejection  fraction was estimated to be 55 %  There were no regional wall motion  abnormalities  Wall thickness was normal  DOPPLER: Left ventricular diastolic  function parameters were normal     RIGHT VENTRICLE: The size was normal  Systolic function was normal  Wall  thickness was normal     LEFT ATRIUM: Size was normal     RIGHT ATRIUM: Size was normal     MITRAL VALVE: Valve structure was normal  There was normal leaflet separation  DOPPLER: The transmitral velocity was within the normal range  There was no  evidence for stenosis  There was no regurgitation  AORTIC VALVE: The valve was trileaflet  Leaflets exhibited normal thickness and  normal cuspal separation   DOPPLER: Transaortic velocity was within the normal  range  There was no evidence for stenosis  There was no regurgitation  TRICUSPID VALVE: The valve structure was normal  There was normal leaflet  separation  DOPPLER: The transtricuspid velocity was within the normal range  There was no evidence for stenosis  There was trace regurgitation  The  tricuspid jet envelope definition was inadequate for estimation of RV systolic  pressure  There are no indirect findings suggestive of moderate or severe  pulmonary hypertension  PULMONIC VALVE: Leaflets exhibited normal thickness, no calcification, and  normal cuspal separation  DOPPLER: The transpulmonic velocity was within the  normal range  There was no regurgitation  PERICARDIUM: There was no pericardial effusion  The pericardium was normal in  appearance  AORTA: The root exhibited normal size  SYSTEMIC VEINS: IVC: The inferior vena cava was normal in size and course    Respirophasic changes were normal     SYSTEM MEASUREMENT TABLES    2D  %FS: 30 56 %  AV Diam: 3 16 cm  EDV(Teich): 114 31 ml  EF Biplane: 50 74 %  EF(Cube): 66 52 %  EF(Teich): 57 86 %  ESV(Cube): 40 06 ml  ESV(Teich): 48 17 ml  IVSd: 0 88 cm  LA Area: 19 cm2  LA Diam: 3 8 cm  LVEDV MOD A2C: 138 49 ml  LVEDV MOD A4C: 93 85 ml  LVEDV MOD BP: 119 51 ml  LVEF MOD A2C: 49 47 %  LVEF MOD A4C: 53 87 %  LVESV MOD A2C: 69 98 ml  LVESV MOD A4C: 43 29 ml  LVESV MOD BP: 58 87 ml  LVIDd: 4 93 cm  LVIDs: 3 42 cm  LVLd A2C: 10 02 cm  LVLd A4C: 9 1 cm  LVLs A2C: 8 5 cm  LVLs A4C: 7 37 cm  LVPWd: 0 89 cm  RA Area: 18 12 cm2  RV Diam: 3 64 cm  SI(Cube): 39 02 ml/m2  SI(Teich): 32 42 ml/m2  SV MOD A2C: 68 51 ml  SV MOD A4C: 50 56 ml  SV(Cube): 79 6 ml  SV(Teich): 66 14 ml    MM  TAPSE: 2 3 cm    PW  E': 0 1 m/s  E/E': 8 56  MV A Humberto: 0 54 m/s  MV Dec Ziebach: 5 58 m/s2  MV DecT: 148 04 ms  MV E Humberto: 0 83 m/s  MV E/A Ratio: 1 52    [employer] Accredited Echocardiography Laboratory    Prepared and electronically signed by    Konrad Gomes Jenny Dunbar MD  Signed 20-Jan-2016 15:16:47       No results found for this or any previous visit  No results found for this or any previous visit  No results found for this or any previous visit  I reviewed and interpreted the following LABS/EKG/TELE/IMAGING and below is summary of my interpretation (if data available):   Interrogation of Pacemaker/Defibrillator/Loop (prior recorded events), etc: Paroxysmal afib seen initiating w/o SVT or other specific triggers

## 2018-07-19 DIAGNOSIS — I48.91 ATRIAL FIBRILLATION, UNSPECIFIED TYPE (HCC): Primary | ICD-10-CM

## 2018-08-08 ENCOUNTER — HOSPITAL ENCOUNTER (OUTPATIENT)
Dept: MRI IMAGING | Facility: HOSPITAL | Age: 33
Discharge: HOME | End: 2018-08-08
Attending: INTERNAL MEDICINE
Payer: COMMERCIAL

## 2018-08-08 DIAGNOSIS — I48.91 ATRIAL FIBRILLATION, UNSPECIFIED TYPE (HCC): ICD-10-CM

## 2018-08-08 PROCEDURE — A9577 INJ MULTIHANCE: HCPCS | Performed by: INTERNAL MEDICINE

## 2018-08-08 PROCEDURE — 75561 CARDIAC MRI FOR MORPH W/DYE: CPT

## 2018-08-08 RX ADMIN — GADOBENATE DIMEGLUMINE 36 ML: 529 INJECTION, SOLUTION INTRAVENOUS at 16:23

## 2018-08-15 DIAGNOSIS — I48.91 ATRIAL FIBRILLATION WITH RAPID VENTRICULAR RESPONSE (HCC): ICD-10-CM

## 2018-08-15 RX ORDER — METOPROLOL SUCCINATE 25 MG/1
25 TABLET, EXTENDED RELEASE ORAL DAILY
Qty: 30 TABLET | Refills: 10 | Status: SHIPPED | OUTPATIENT
Start: 2018-08-15 | End: 2019-07-17 | Stop reason: SDUPTHER

## 2018-09-02 ENCOUNTER — TELEPHONE (OUTPATIENT)
Dept: INPATIENT UNIT | Facility: HOSPITAL | Age: 33
End: 2018-09-02

## 2018-09-03 RX ORDER — PANTOPRAZOLE SODIUM 40 MG/1
40 INJECTION, POWDER, FOR SOLUTION INTRAVENOUS ONCE
Status: CANCELLED | OUTPATIENT
Start: 2018-09-03 | End: 2018-09-03

## 2018-09-04 ENCOUNTER — ANESTHESIA (OUTPATIENT)
Dept: SURGERY | Facility: HOSPITAL | Age: 33
End: 2018-09-04
Payer: COMMERCIAL

## 2018-09-04 ENCOUNTER — HOSPITAL ENCOUNTER (OUTPATIENT)
Dept: NON INVASIVE DIAGNOSTICS | Facility: HOSPITAL | Age: 33
Discharge: HOME | End: 2018-09-05
Attending: INTERNAL MEDICINE | Admitting: INTERNAL MEDICINE
Payer: COMMERCIAL

## 2018-09-04 ENCOUNTER — ANESTHESIA EVENT (OUTPATIENT)
Dept: SURGERY | Facility: HOSPITAL | Age: 33
End: 2018-09-04
Payer: COMMERCIAL

## 2018-09-04 DIAGNOSIS — I48.91 ATRIAL FIBRILLATION (HCC): Primary | ICD-10-CM

## 2018-09-04 LAB
ANION GAP SERPL CALCULATED.3IONS-SCNC: 4 MMOL/L (ref 4–13)
ATRIAL RATE: 64 BPM
ATRIAL RATE: 95 BPM
ATRIAL RATE: 96 BPM
BASOPHILS # BLD AUTO: 0.03 THOUSANDS/ΜL (ref 0–0.1)
BASOPHILS NFR BLD AUTO: 1 % (ref 0–1)
BUN SERPL-MCNC: 22 MG/DL (ref 5–25)
CALCIUM SERPL-MCNC: 8.8 MG/DL (ref 8.3–10.1)
CHLORIDE SERPL-SCNC: 105 MMOL/L (ref 100–108)
CO2 SERPL-SCNC: 28 MMOL/L (ref 21–32)
CREAT SERPL-MCNC: 0.74 MG/DL (ref 0.6–1.3)
EOSINOPHIL # BLD AUTO: 0.06 THOUSAND/ΜL (ref 0–0.61)
EOSINOPHIL NFR BLD AUTO: 1 % (ref 0–6)
ERYTHROCYTE [DISTWIDTH] IN BLOOD BY AUTOMATED COUNT: 11.7 % (ref 11.6–15.1)
GFR SERPL CREATININE-BSD FRML MDRD: 123 ML/MIN/1.73SQ M
GLUCOSE P FAST SERPL-MCNC: 89 MG/DL (ref 65–99)
GLUCOSE SERPL-MCNC: 89 MG/DL (ref 65–140)
HCT VFR BLD AUTO: 41.5 % (ref 36.5–49.3)
HGB BLD-MCNC: 14.3 G/DL (ref 12–17)
IMM GRANULOCYTES # BLD AUTO: 0.02 THOUSAND/UL (ref 0–0.2)
IMM GRANULOCYTES NFR BLD AUTO: 0 % (ref 0–2)
INR PPP: 1.07 (ref 0.86–1.17)
KCT BLD-ACNC: 160 SEC (ref 89–137)
KCT BLD-ACNC: 291 SEC (ref 89–137)
KCT BLD-ACNC: 297 SEC (ref 89–137)
KCT BLD-ACNC: 322 SEC (ref 89–137)
KCT BLD-ACNC: 329 SEC (ref 89–137)
LYMPHOCYTES # BLD AUTO: 1.7 THOUSANDS/ΜL (ref 0.6–4.47)
LYMPHOCYTES NFR BLD AUTO: 30 % (ref 14–44)
MAGNESIUM SERPL-MCNC: 2.1 MG/DL (ref 1.6–2.6)
MCH RBC QN AUTO: 29.5 PG (ref 26.8–34.3)
MCHC RBC AUTO-ENTMCNC: 34.5 G/DL (ref 31.4–37.4)
MCV RBC AUTO: 86 FL (ref 82–98)
MONOCYTES # BLD AUTO: 0.36 THOUSAND/ΜL (ref 0.17–1.22)
MONOCYTES NFR BLD AUTO: 6 % (ref 4–12)
NEUTROPHILS # BLD AUTO: 3.59 THOUSANDS/ΜL (ref 1.85–7.62)
NEUTS SEG NFR BLD AUTO: 62 % (ref 43–75)
NRBC BLD AUTO-RTO: 0 /100 WBCS
P AXIS: 16 DEGREES
P AXIS: 34 DEGREES
P AXIS: 61 DEGREES
PLATELET # BLD AUTO: 175 THOUSANDS/UL (ref 149–390)
PMV BLD AUTO: 10.7 FL (ref 8.9–12.7)
POTASSIUM SERPL-SCNC: 3.8 MMOL/L (ref 3.5–5.3)
PR INTERVAL: 136 MS
PR INTERVAL: 142 MS
PR INTERVAL: 156 MS
PROTHROMBIN TIME: 14 SECONDS (ref 11.8–14.2)
QRS AXIS: 67 DEGREES
QRS AXIS: 71 DEGREES
QRS AXIS: 83 DEGREES
QRSD INTERVAL: 100 MS
QRSD INTERVAL: 106 MS
QRSD INTERVAL: 92 MS
QT INTERVAL: 354 MS
QT INTERVAL: 370 MS
QT INTERVAL: 428 MS
QTC INTERVAL: 441 MS
QTC INTERVAL: 448 MS
QTC INTERVAL: 464 MS
RBC # BLD AUTO: 4.84 MILLION/UL (ref 3.88–5.62)
SODIUM SERPL-SCNC: 137 MMOL/L (ref 136–145)
SPECIMEN SOURCE: ABNORMAL
T WAVE AXIS: 31 DEGREES
T WAVE AXIS: 35 DEGREES
T WAVE AXIS: 37 DEGREES
VENTRICULAR RATE: 64 BPM
VENTRICULAR RATE: 95 BPM
VENTRICULAR RATE: 96 BPM
WBC # BLD AUTO: 5.76 THOUSAND/UL (ref 4.31–10.16)

## 2018-09-04 PROCEDURE — 93623 PRGRMD STIMJ&PACG IV RX NFS: CPT | Performed by: INTERNAL MEDICINE

## 2018-09-04 PROCEDURE — C1769 GUIDE WIRE: HCPCS | Performed by: INTERNAL MEDICINE

## 2018-09-04 PROCEDURE — 93613 INTRACARDIAC EPHYS 3D MAPG: CPT | Performed by: INTERNAL MEDICINE

## 2018-09-04 PROCEDURE — C1894 INTRO/SHEATH, NON-LASER: HCPCS | Performed by: INTERNAL MEDICINE

## 2018-09-04 PROCEDURE — 83735 ASSAY OF MAGNESIUM: CPT | Performed by: PHYSICIAN ASSISTANT

## 2018-09-04 PROCEDURE — 93010 ELECTROCARDIOGRAM REPORT: CPT | Performed by: INTERNAL MEDICINE

## 2018-09-04 PROCEDURE — C1730 CATH, EP, 19 OR FEW ELECT: HCPCS | Performed by: INTERNAL MEDICINE

## 2018-09-04 PROCEDURE — 93656 COMPRE EP EVAL ABLTJ ATR FIB: CPT | Performed by: INTERNAL MEDICINE

## 2018-09-04 PROCEDURE — C1893 INTRO/SHEATH, FIXED,NON-PEEL: HCPCS | Performed by: INTERNAL MEDICINE

## 2018-09-04 PROCEDURE — C1726 CATH, BAL DIL, NON-VASCULAR: HCPCS | Performed by: INTERNAL MEDICINE

## 2018-09-04 PROCEDURE — 85347 COAGULATION TIME ACTIVATED: CPT

## 2018-09-04 PROCEDURE — 93662 INTRACARDIAC ECG (ICE): CPT | Performed by: INTERNAL MEDICINE

## 2018-09-04 PROCEDURE — 80048 BASIC METABOLIC PNL TOTAL CA: CPT | Performed by: PHYSICIAN ASSISTANT

## 2018-09-04 PROCEDURE — 93005 ELECTROCARDIOGRAM TRACING: CPT

## 2018-09-04 PROCEDURE — 85610 PROTHROMBIN TIME: CPT | Performed by: INTERNAL MEDICINE

## 2018-09-04 PROCEDURE — C1759 CATH, INTRA ECHOCARDIOGRAPHY: HCPCS | Performed by: INTERNAL MEDICINE

## 2018-09-04 PROCEDURE — 85025 COMPLETE CBC W/AUTO DIFF WBC: CPT | Performed by: PHYSICIAN ASSISTANT

## 2018-09-04 PROCEDURE — C9113 INJ PANTOPRAZOLE SODIUM, VIA: HCPCS | Performed by: PHYSICIAN ASSISTANT

## 2018-09-04 RX ORDER — DABIGATRAN ETEXILATE 150 MG/1
150 CAPSULE, COATED PELLETS ORAL 2 TIMES DAILY
COMMUNITY
End: 2018-10-02 | Stop reason: ALTCHOICE

## 2018-09-04 RX ORDER — MIDAZOLAM HYDROCHLORIDE 1 MG/ML
INJECTION INTRAMUSCULAR; INTRAVENOUS AS NEEDED
Status: DISCONTINUED | OUTPATIENT
Start: 2018-09-04 | End: 2018-09-04 | Stop reason: SURG

## 2018-09-04 RX ORDER — LIDOCAINE HYDROCHLORIDE 10 MG/ML
INJECTION, SOLUTION INFILTRATION; PERINEURAL CODE/TRAUMA/SEDATION MEDICATION
Status: COMPLETED | OUTPATIENT
Start: 2018-09-04 | End: 2018-09-04

## 2018-09-04 RX ORDER — FENTANYL CITRATE/PF 50 MCG/ML
25 SYRINGE (ML) INJECTION
Status: DISCONTINUED | OUTPATIENT
Start: 2018-09-04 | End: 2018-09-04 | Stop reason: HOSPADM

## 2018-09-04 RX ORDER — METOCLOPRAMIDE HYDROCHLORIDE 5 MG/ML
10 INJECTION INTRAMUSCULAR; INTRAVENOUS ONCE AS NEEDED
Status: DISCONTINUED | OUTPATIENT
Start: 2018-09-04 | End: 2018-09-04 | Stop reason: HOSPADM

## 2018-09-04 RX ORDER — HEPARIN SODIUM 1000 [USP'U]/ML
INJECTION, SOLUTION INTRAVENOUS; SUBCUTANEOUS CODE/TRAUMA/SEDATION MEDICATION
Status: COMPLETED | OUTPATIENT
Start: 2018-09-04 | End: 2018-09-04

## 2018-09-04 RX ORDER — SODIUM CHLORIDE 9 MG/ML
INJECTION, SOLUTION INTRAVENOUS CONTINUOUS PRN
Status: DISCONTINUED | OUTPATIENT
Start: 2018-09-04 | End: 2018-09-04 | Stop reason: SURG

## 2018-09-04 RX ORDER — PANTOPRAZOLE SODIUM 40 MG/1
40 INJECTION, POWDER, FOR SOLUTION INTRAVENOUS ONCE
Status: COMPLETED | OUTPATIENT
Start: 2018-09-04 | End: 2018-09-04

## 2018-09-04 RX ORDER — DABIGATRAN ETEXILATE 150 MG/1
150 CAPSULE, COATED PELLETS ORAL 2 TIMES DAILY
Status: DISCONTINUED | OUTPATIENT
Start: 2018-09-04 | End: 2018-09-05 | Stop reason: HOSPADM

## 2018-09-04 RX ORDER — GLYCOPYRROLATE 0.2 MG/ML
INJECTION INTRAMUSCULAR; INTRAVENOUS AS NEEDED
Status: DISCONTINUED | OUTPATIENT
Start: 2018-09-04 | End: 2018-09-04 | Stop reason: SURG

## 2018-09-04 RX ORDER — ONDANSETRON 2 MG/ML
4 INJECTION INTRAMUSCULAR; INTRAVENOUS ONCE AS NEEDED
Status: DISCONTINUED | OUTPATIENT
Start: 2018-09-04 | End: 2018-09-04 | Stop reason: HOSPADM

## 2018-09-04 RX ORDER — FENTANYL CITRATE 50 UG/ML
INJECTION, SOLUTION INTRAMUSCULAR; INTRAVENOUS AS NEEDED
Status: DISCONTINUED | OUTPATIENT
Start: 2018-09-04 | End: 2018-09-04 | Stop reason: SURG

## 2018-09-04 RX ORDER — PANTOPRAZOLE SODIUM 40 MG/1
40 TABLET, DELAYED RELEASE ORAL
Status: DISCONTINUED | OUTPATIENT
Start: 2018-09-05 | End: 2018-09-05 | Stop reason: HOSPADM

## 2018-09-04 RX ORDER — HEPARIN SODIUM 10000 [USP'U]/100ML
INJECTION, SOLUTION INTRAVENOUS
Status: COMPLETED | OUTPATIENT
Start: 2018-09-04 | End: 2018-09-04

## 2018-09-04 RX ORDER — ADENOSINE 3 MG/ML
INJECTION INTRAVENOUS CODE/TRAUMA/SEDATION MEDICATION
Status: COMPLETED | OUTPATIENT
Start: 2018-09-04 | End: 2018-09-04

## 2018-09-04 RX ORDER — METOPROLOL SUCCINATE 25 MG/1
25 TABLET, EXTENDED RELEASE ORAL DAILY
Status: DISCONTINUED | OUTPATIENT
Start: 2018-09-04 | End: 2018-09-05 | Stop reason: HOSPADM

## 2018-09-04 RX ORDER — PROPOFOL 10 MG/ML
INJECTION, EMULSION INTRAVENOUS CONTINUOUS PRN
Status: DISCONTINUED | OUTPATIENT
Start: 2018-09-04 | End: 2018-09-04 | Stop reason: SURG

## 2018-09-04 RX ORDER — ACETAMINOPHEN 325 MG/1
650 TABLET ORAL EVERY 4 HOURS PRN
Status: DISCONTINUED | OUTPATIENT
Start: 2018-09-04 | End: 2018-09-05 | Stop reason: HOSPADM

## 2018-09-04 RX ORDER — REMIFENTANIL HYDROCHLORIDE 1 MG/ML
INJECTION, POWDER, LYOPHILIZED, FOR SOLUTION INTRAVENOUS CONTINUOUS PRN
Status: DISCONTINUED | OUTPATIENT
Start: 2018-09-04 | End: 2018-09-04 | Stop reason: SURG

## 2018-09-04 RX ORDER — PROTAMINE SULFATE 10 MG/ML
INJECTION, SOLUTION INTRAVENOUS AS NEEDED
Status: DISCONTINUED | OUTPATIENT
Start: 2018-09-04 | End: 2018-09-04 | Stop reason: SURG

## 2018-09-04 RX ORDER — ONDANSETRON 2 MG/ML
INJECTION INTRAMUSCULAR; INTRAVENOUS AS NEEDED
Status: DISCONTINUED | OUTPATIENT
Start: 2018-09-04 | End: 2018-09-04 | Stop reason: SURG

## 2018-09-04 RX ORDER — EPHEDRINE SULFATE 50 MG/ML
INJECTION, SOLUTION INTRAVENOUS AS NEEDED
Status: DISCONTINUED | OUTPATIENT
Start: 2018-09-04 | End: 2018-09-04 | Stop reason: SURG

## 2018-09-04 RX ORDER — SODIUM CHLORIDE 9 MG/ML
20 INJECTION, SOLUTION INTRAVENOUS CONTINUOUS
Status: DISCONTINUED | OUTPATIENT
Start: 2018-09-04 | End: 2018-09-05 | Stop reason: HOSPADM

## 2018-09-04 RX ADMIN — FENTANYL CITRATE 50 MCG: 50 INJECTION, SOLUTION INTRAMUSCULAR; INTRAVENOUS at 09:07

## 2018-09-04 RX ADMIN — LIDOCAINE HYDROCHLORIDE 7 ML: 10 INJECTION, SOLUTION INFILTRATION; PERINEURAL at 08:32

## 2018-09-04 RX ADMIN — FENTANYL CITRATE 25 MCG: 50 INJECTION, SOLUTION INTRAMUSCULAR; INTRAVENOUS at 11:49

## 2018-09-04 RX ADMIN — ISOPROTERENOL HYDROCHLORIDE 2 MCG/MIN: 0.2 INJECTION, SOLUTION INTRAMUSCULAR; INTRAVENOUS at 08:53

## 2018-09-04 RX ADMIN — SODIUM CHLORIDE: 0.9 INJECTION, SOLUTION INTRAVENOUS at 07:56

## 2018-09-04 RX ADMIN — DABIGATRAN ETEXILATE MESYLATE 150 MG: 150 CAPSULE ORAL at 19:10

## 2018-09-04 RX ADMIN — DEXAMETHASONE SODIUM PHOSPHATE 10 MG: 10 INJECTION INTRAMUSCULAR; INTRAVENOUS at 10:23

## 2018-09-04 RX ADMIN — GLYCOPYRROLATE 0.1 MG: 0.2 INJECTION, SOLUTION INTRAMUSCULAR; INTRAVENOUS at 08:04

## 2018-09-04 RX ADMIN — REMIFENTANIL HYDROCHLORIDE 0.04 MCG/KG/MIN: 1 INJECTION, POWDER, LYOPHILIZED, FOR SOLUTION INTRAVENOUS at 07:59

## 2018-09-04 RX ADMIN — PHENYLEPHRINE HYDROCHLORIDE 40 MCG/MIN: 10 INJECTION INTRAVENOUS at 09:01

## 2018-09-04 RX ADMIN — HEPARIN SODIUM 9000 UNITS: 1000 INJECTION INTRAVENOUS; SUBCUTANEOUS at 08:33

## 2018-09-04 RX ADMIN — PROTAMINE SULFATE 20 MG: 10 INJECTION, SOLUTION INTRAVENOUS at 10:09

## 2018-09-04 RX ADMIN — EPHEDRINE SULFATE 5 MG: 50 INJECTION, SOLUTION INTRAMUSCULAR; INTRAVENOUS; SUBCUTANEOUS at 08:37

## 2018-09-04 RX ADMIN — LIDOCAINE HYDROCHLORIDE 8 ML: 10 INJECTION, SOLUTION INFILTRATION; PERINEURAL at 08:32

## 2018-09-04 RX ADMIN — PROTAMINE SULFATE 30 MG: 10 INJECTION, SOLUTION INTRAVENOUS at 10:13

## 2018-09-04 RX ADMIN — HEPARIN SODIUM 3000 UNITS: 1000 INJECTION INTRAVENOUS; SUBCUTANEOUS at 09:25

## 2018-09-04 RX ADMIN — FENTANYL CITRATE 50 MCG: 50 INJECTION, SOLUTION INTRAMUSCULAR; INTRAVENOUS at 07:58

## 2018-09-04 RX ADMIN — PANTOPRAZOLE SODIUM 40 MG: 40 INJECTION, POWDER, FOR SOLUTION INTRAVENOUS at 08:06

## 2018-09-04 RX ADMIN — EPHEDRINE SULFATE 10 MG: 50 INJECTION, SOLUTION INTRAMUSCULAR; INTRAVENOUS; SUBCUTANEOUS at 08:24

## 2018-09-04 RX ADMIN — SODIUM CHLORIDE: 9 INJECTION, SOLUTION INTRAVENOUS at 07:45

## 2018-09-04 RX ADMIN — PROPOFOL 100 MCG/KG/MIN: 10 INJECTION, EMULSION INTRAVENOUS at 08:00

## 2018-09-04 RX ADMIN — IOHEXOL 40 ML: 350 INJECTION, SOLUTION INTRAVENOUS at 10:39

## 2018-09-04 RX ADMIN — HEPARIN SODIUM 3000 UNITS/HR: 10000 INJECTION, SOLUTION INTRAVENOUS at 08:33

## 2018-09-04 RX ADMIN — MIDAZOLAM 2 MG: 1 INJECTION INTRAMUSCULAR; INTRAVENOUS at 07:58

## 2018-09-04 RX ADMIN — HEPARIN SODIUM 2000 UNITS: 1000 INJECTION INTRAVENOUS; SUBCUTANEOUS at 09:03

## 2018-09-04 RX ADMIN — ONDANSETRON 4 MG: 2 INJECTION INTRAMUSCULAR; INTRAVENOUS at 10:20

## 2018-09-04 RX ADMIN — ADENOSINE 18 MG: 3 INJECTION, SOLUTION INTRAVENOUS at 08:51

## 2018-09-04 RX ADMIN — FENTANYL CITRATE 25 MCG: 50 INJECTION, SOLUTION INTRAMUSCULAR; INTRAVENOUS at 11:01

## 2018-09-04 NOTE — H&P
Electrophysiology-Cardiology (EP)   Syeda Sheppard 34 y o  male MRN: [id number]  [place] [facility] [place] Encounter: [id number]        IMPRESSION:    1  Paroxysmal afib, recurrent and symptomatic,  2  [id number]- 0 on pradaxa  3  H/o 15s pause w syncope on flecainide  4  H/o perimyocarditis , normal ef now  PLAN:  1  Afib ablation    INFORMED CONSENT: Risks, benefits, and alternatives to atrial fibrillation ablation with possibly a combination of cryoballoon and radiofrequency ablation, and associated procedures such as transesophageal echocardiogram and atrial flutter ablation discussed  Alternative treatment with medical management has been tried and/or discussed  Usual pre and post operative expectations were discussed  Estimated complication rate is <7%  Atrial fibrillation ablation was explained to be a treatment that reduces burden of atrial fibrillation but is not a cure and medications and repeat ablations are often required  Although most patients derive improvement in symptoms and burden in atrial fibrillation, there are some patients that have not improved following this procedure  Blood thinners will not be discontinued immediately after ablation and may be required long term regardless of result  The patient understood risks include but not limited to death, esophageal injury, phrenic nerve injury (diaphragm), stroke, bleeding and vascular complication, bleeding around the heart and open heart surgery  Referring Physian: Parker Faulkner MD    Chief Complain/Reason for Referal: Robles Tariq is a 34 y o  Patient Active Problem List    Diagnosis Date Noted    Syncope 06/26/2017     Priority: Low    Paroxysmal atrial fibrillation ([facility] ) 06/26/2017     Priority: Low    Atrial fibrillation with rapid ventricular response ([facility] ) 01/17/2016     Priority: Low     S: feels well   No active issues, last afib a couple months ago        Past Medical History:   Diagnosis Date    Atrial fibrillation [facility] )     Palpitations     Pericarditis 12/2012    Admission to [facility] at that time    Status post placement of implantable loop recorder        Prescriptions Prior to Admission   Medication    dabigatran etexilate (PRADAXA) 150 mg capsu    metoprolol succinate (TOPROL-XL) 25 mg 24 hr tablet    Multiple Vitamin (MULTIVITAMIN) capsule       Scheduled Meds:  Continuous Infusions:  No current facility-administered medications for this encounter  PRN Meds:  No Known Allergies  I reviewed the Home Medication list in the chart  Family History   Problem Relation Age of Onset    Atrial fibrillation Father     Hypertension Father     Heart attack Neg Hx     Stroke Neg Hx     Anuerysm Neg Hx     Clotting disorder Neg Hx     Hyperlipidemia Neg Hx     Heart failure Neg Hx        Social History     Social History    Marital status: /Civil Union     Spouse name: N/A    Number of children: N/A    Years of education: N/A     Occupational History    Not on file  Social History Main Topics    Smoking status: Former Smoker    Smokeless tobacco: Never Used    Alcohol use No    Drug use: No    Sexual activity: Yes     Other Topics Concern    Not on file     Social History Narrative    No narrative on file       Review of Systems -12 Point ROS reviewed and are negative or noted in chart except for Pertinent Positives Pertaining to Cardiovascular and Respiratory in HPI above       Vitals:    09/04/18 0727   BP: 120/82   Pulse: 58   Resp: 16   Temp: 97 8 °F (36 6 °C)   SpO2: 98%     Vitals:    09/04/18 0734   Weight: 90 7 kg (200 lb)     Intake/Output Summary (Last 24 hours) at 09/04/18 1045  Last data filed at 09/04/18 1011   Gross per 24 hour   Intake              900 ml   Output                0 ml   Net              900 ml       GEN: No acute distress, Alert and oriented, well appearing  HEENT:Head, neck, ears, oral pharynx: Mucus membranes moist, oral pharynx clear, nares clear  External ears normal  EYES: Pupils equal, sclera anicteric, midline, normal conjuctiva  NECK: No JVD, supple, no obvious masses or thryomegaly or goiter  CARDIOVASCULAR: RRR  LUNGS: normal effort, breathing comfortablyEXTREMITIES/VASCULAR: No edema  Radial pulses intact, pedal pulses difficult to palpate, warm an well perfused  PSYCH: Normal Affect, no overt suicidal ideation, linear speech pattern without evidence of psychosis  NEURO: Grossly intact, moving all extremiteis equal, face symmetric, alert and responsive, no obvious focal defecits  HEME: No bleeding, bruising, petechia, purpura  SKIN: No significant rashes, warm, no diaphoresis or pallor  Lab Results:     CBC with diff:   Results from last 7 days  Lab Units 09/04/18  0641   WBC Thousand/uL 5 76   HEMOGLOBIN g/dL 14 3   HEMATOCRIT % 41 5   MCV fL 86   PLATELETS Thousands/uL 175   MCH pg 29 5   MCHC g/dL 34 5   RDW % 11 7   MPV fL 10 7   NRBC AUTO /100 WBCs 0         CMP:  Results from last 7 days  Lab Units 09/04/18  0641   SODIUM mmol/L 137   POTASSIUM mmol/L 3 8   CHLORIDE mmol/L 105   CO2 mmol/L 28   BUN mg/dL 22   CREATININE mg/dL 0 74   CALCIUM mg/dL 8 8   EGFR ml/min/1 73sq m 123         BMP:  Results from last 7 days  Lab Units 09/04/18  0641   SODIUM mmol/L 137   POTASSIUM mmol/L 3 8   CHLORIDE mmol/L 105   CO2 mmol/L 28   BUN mg/dL 22   CREATININE mg/dL 0 74   CALCIUM mg/dL 8 8       BNP:   Results Reviewed     None        No results for input(s): BNP in the last 72 hours               Magnesium:   Results from last 7 days  Lab Units 09/04/18  0641   MAGNESIUM mg/dL 2 1       Coags:   Results from last 7 days  Lab Units 09/04/18  0643   INR  1 07       TSH:       Lipid Profile:         Cardiac testing:   Results for orders placed during the hospital encounter of 01/20/16   Echo complete with contrast if indicated    Narrative Yamile [address] [place] [address], [address] [place] [address]  [phone]    Transthoracic Echocardiogram  2D, M-mode, Doppler, and Color Doppler    Study date:  2016    Patient: Desean Ruiz  MR number: [id number]  Account number: [de-identified]  : 1985  Age: 31 years  Gender: Male  Status: Outpatient  Location: [address] [place] [address]  Height: 70 in  Weight: 189 6 lb  BP: 108/ 64 mmHg    Indications: Atrial fibrillation  Diagnoses: I48 0 - Atrial fibrillation    Sonographer:  BELL Lugo  Referring Physician:  Carly Ernst DO  Group:  [facility]  cc:  Elonda Gowers, MD  Interpreting Physician:  Mynor Metzger MD    SUMMARY    LEFT VENTRICLE:  Size was normal   Systolic function was normal  Ejection fraction was estimated to be 55 %  Wall thickness was normal   Left ventricular diastolic function parameters were normal     RIGHT VENTRICLE:  The size was normal   Systolic function was normal     TRICUSPID VALVE:  There was trace regurgitation  HISTORY: PRIOR HISTORY: Atrial Fibrillation, Chest Pain, Pericarditis,    PROCEDURE: The study was performed in the [address] [place] [address]  This was a routine study  The transthoracic approach was used  The study  included complete 2D imaging, M-mode, complete spectral Doppler, and color  Doppler  The heart rate was 60 bpm, at the start of the study  Images were  obtained from the parasternal, apical, subcostal, and suprasternal notch  acoustic windows  Image quality was good  LEFT VENTRICLE: Size was normal  Systolic function was normal  Ejection  fraction was estimated to be 55 %  There were no regional wall motion  abnormalities   Wall thickness was normal  DOPPLER: Left ventricular diastolic  function parameters were normal     RIGHT VENTRICLE: The size was normal  Systolic function was normal  Wall  thickness was normal     LEFT ATRIUM: Size was normal     RIGHT ATRIUM: Size was normal     MITRAL VALVE: Valve structure was normal  There was normal leaflet separation  DOPPLER: The transmitral velocity was within the normal range  There was no  evidence for stenosis  There was no regurgitation  AORTIC VALVE: The valve was trileaflet  Leaflets exhibited normal thickness and  normal cuspal separation  DOPPLER: Transaortic velocity was within the normal  range  There was no evidence for stenosis  There was no regurgitation  TRICUSPID VALVE: The valve structure was normal  There was normal leaflet  separation  DOPPLER: The transtricuspid velocity was within the normal range  There was no evidence for stenosis  There was trace regurgitation  The  tricuspid jet envelope definition was inadequate for estimation of RV systolic  pressure  There are no indirect findings suggestive of moderate or severe  pulmonary hypertension  PULMONIC VALVE: Leaflets exhibited normal thickness, no calcification, and  normal cuspal separation  DOPPLER: The transpulmonic velocity was within the  normal range  There was no regurgitation  PERICARDIUM: There was no pericardial effusion  The pericardium was normal in  appearance  AORTA: The root exhibited normal size  SYSTEMIC VEINS: IVC: The inferior vena cava was normal in size and course    Respirophasic changes were normal     SYSTEM MEASUREMENT TABLES    2D  %FS: 30 56 %  AV Diam: 3 16 cm  EDV(Teich): 114 31 ml  EF Biplane: 50 74 %  EF(Cube): 66 52 %  EF(Teich): 57 86 %  ESV(Cube): 40 06 ml  ESV(Teich): 48 17 ml  IVSd: 0 88 cm  LA Area: 19 cm2  LA Diam: 3 8 cm  LVEDV MOD A2C: 138 49 ml  LVEDV MOD A4C: 93 85 ml  LVEDV MOD BP: 119 51 ml  LVEF MOD A2C: 49 47 %  LVEF MOD A4C: 53 87 %  LVESV MOD A2C: 69 98 ml  LVESV MOD A4C: 43 29 ml  LVESV MOD BP: 58 87 ml  LVIDd: 4 93 cm  LVIDs: 3 42 cm  LVLd A2C: 10 02 cm  LVLd A4C: 9 1 cm  LVLs A2C: 8 5 cm  LVLs A4C: 7 37 cm  LVPWd: 0 89 cm  RA Area: 18 12 cm2  RV Diam: 3 64 cm  SI(Cube): 39 02 ml/m2  SI(Teich): 32 42 ml/m2  SV MOD A2C: 68 51 ml  SV MOD A4C: 50 56 ml  SV(Cube): 79 6 ml  SV(Teich): 66 14 ml    MM  TAPSE: 2 3 cm    PW  E': 0 1 m/s  E/E': 8 56  MV A Humberto: 0 54 m/s  MV Dec [place]: 5 58 m/s2  MV DecT: 148 04 ms  MV E Humberto: 0 83 m/s  MV E/A Ratio: 1 52    [employer] Accredited Echocardiography Laboratory    Prepared and electronically signed by    Samuel Huang MD  Signed 20-Jan-2016 15:16:47

## 2018-09-04 NOTE — PERIOPERATIVE NURSING NOTE
VSS, pt denies nausea, reports improvement in pain, assessment unchanged  Report called, no questions, pt transferred to floor

## 2018-09-04 NOTE — ANESTHESIA POSTPROCEDURE EVALUATION
Post-Op Assessment Note      CV Status:  Stable    Mental Status:  Alert and awake    Hydration Status:  Euvolemic    PONV Controlled:  Controlled    Airway Patency:  Patent    Post Op Vitals Reviewed: Yes          Staff: CRNA           BP   130/74   Temp   97   Pulse  98   Resp  18    SpO2   100

## 2018-09-04 NOTE — ANESTHESIA PREPROCEDURE EVALUATION
Review of Systems/Medical History  Patient summary reviewed  Chart reviewed  No history of anesthetic complications     Cardiovascular  EKG reviewed, Exercise tolerance (METS): >4,  Dysrhythmias (paroxysmal) , atrial fibrillation,   Comment: Echo (1/20/16):  LEFT VENTRICLE:  Size was normal   Systolic function was normal  Ejection fraction was estimated to be 55 %  Wall thickness was normal   Left ventricular diastolic function parameters were normal      RIGHT VENTRICLE:  The size was normal   Systolic function was normal      TRICUSPID VALVE:  There was trace regurgitation  ,  Pulmonary  Smoker cigar smoker  , Tobacco cessation counseling given ,        GI/Hepatic  Negative GI/hepatic ROS          Negative  ROS        Endo/Other  Negative endo/other ROS      GYN       Hematology  Negative hematology ROS      Musculoskeletal  Negative musculoskeletal ROS        Neurology  Negative neurology ROS      Psychology   Negative psychology ROS              Physical Exam    Airway    Mallampati score: II  TM Distance: >3 FB  Neck ROM: full     Dental   No notable dental hx     Cardiovascular  Rhythm: regular, Rate: normal, Cardiovascular exam normal    Pulmonary  Pulmonary exam normal Breath sounds clear to auscultation,     Other Findings        Anesthesia Plan  ASA Score- 2     Anesthesia Type-     Plan Factors-    Induction- intravenous  Postoperative Plan-     Informed Consent- Anesthetic plan and risks discussed with patient  I personally reviewed this patient with the CRNA  Discussed and agreed on the Anesthesia Plan with the CRNA           NPO verified  NKDA  Pt took Pradaxa and metoprolol this AM     Plan:  IV sedation; GA as backup    Risks and benefits discussed with patient  Questions answered  Patient consented

## 2018-09-05 VITALS
HEIGHT: 69 IN | SYSTOLIC BLOOD PRESSURE: 119 MMHG | OXYGEN SATURATION: 96 % | TEMPERATURE: 98.2 F | WEIGHT: 200 LBS | BODY MASS INDEX: 29.62 KG/M2 | RESPIRATION RATE: 18 BRPM | DIASTOLIC BLOOD PRESSURE: 64 MMHG | HEART RATE: 83 BPM

## 2018-09-05 LAB
ANION GAP SERPL CALCULATED.3IONS-SCNC: 7 MMOL/L (ref 4–13)
BUN SERPL-MCNC: 12 MG/DL (ref 5–25)
CALCIUM SERPL-MCNC: 8.9 MG/DL (ref 8.3–10.1)
CHLORIDE SERPL-SCNC: 105 MMOL/L (ref 100–108)
CO2 SERPL-SCNC: 28 MMOL/L (ref 21–32)
CREAT SERPL-MCNC: 0.62 MG/DL (ref 0.6–1.3)
ERYTHROCYTE [DISTWIDTH] IN BLOOD BY AUTOMATED COUNT: 11.6 % (ref 11.6–15.1)
GFR SERPL CREATININE-BSD FRML MDRD: 132 ML/MIN/1.73SQ M
GLUCOSE SERPL-MCNC: 97 MG/DL (ref 65–140)
HCT VFR BLD AUTO: 40.3 % (ref 36.5–49.3)
HGB BLD-MCNC: 14.1 G/DL (ref 12–17)
MAGNESIUM SERPL-MCNC: 2.1 MG/DL (ref 1.6–2.6)
MCH RBC QN AUTO: 29.7 PG (ref 26.8–34.3)
MCHC RBC AUTO-ENTMCNC: 35 G/DL (ref 31.4–37.4)
MCV RBC AUTO: 85 FL (ref 82–98)
PLATELET # BLD AUTO: 186 THOUSANDS/UL (ref 149–390)
PMV BLD AUTO: 10.5 FL (ref 8.9–12.7)
POTASSIUM SERPL-SCNC: 3.7 MMOL/L (ref 3.5–5.3)
RBC # BLD AUTO: 4.75 MILLION/UL (ref 3.88–5.62)
SODIUM SERPL-SCNC: 140 MMOL/L (ref 136–145)
WBC # BLD AUTO: 12.29 THOUSAND/UL (ref 4.31–10.16)

## 2018-09-05 PROCEDURE — 80048 BASIC METABOLIC PNL TOTAL CA: CPT | Performed by: PHYSICIAN ASSISTANT

## 2018-09-05 PROCEDURE — 83735 ASSAY OF MAGNESIUM: CPT | Performed by: PHYSICIAN ASSISTANT

## 2018-09-05 PROCEDURE — 85027 COMPLETE CBC AUTOMATED: CPT | Performed by: PHYSICIAN ASSISTANT

## 2018-09-05 RX ORDER — PANTOPRAZOLE SODIUM 40 MG/1
40 TABLET, DELAYED RELEASE ORAL
Qty: 30 TABLET | Refills: 0 | Status: SHIPPED | OUTPATIENT
Start: 2018-09-06 | End: 2019-01-09 | Stop reason: ALTCHOICE

## 2018-09-05 RX ADMIN — METOPROLOL SUCCINATE 25 MG: 25 TABLET, EXTENDED RELEASE ORAL at 08:20

## 2018-09-05 RX ADMIN — PANTOPRAZOLE SODIUM 40 MG: 40 TABLET, DELAYED RELEASE ORAL at 06:23

## 2018-09-05 NOTE — DISCHARGE INSTRUCTIONS
No heavy lifting or strenuous activity for one week  No soaking in a bath tub/hot tub/swimming pool for one week or until groin heals  If you notice ongoing bleeding, swelling, or firm lumps in groin near ablation incision, please contact Dr Jelly Bennett office - [phone]

## 2018-09-05 NOTE — DISCHARGE SUMMARY
Discharge Summary - Raji Knight 34 y o  male MRN: [id number]    Unit/Bed#: CW2 210-02 Encounter: [id number]      Admission Date: 9/4/2018   Discharge Date: 9/5/2018    Discharge Diagnosis:     Procedures Performed: paroxsymal nonvalvular atrial fibrillation AC w/ pradaxa s/p cryo PVI and RFA CTI  Orders Placed This Encounter   Procedures    Cardiac eps/afib ablation       Consultants: none     HPI: Please refer to the initial history and physical as well as procedure notes for full details  Briefly, Raji Knight is a 34y o  year old male with a history of paroxsymal nonvalvular atrial fibrillation AC w/ pradaxa, s/p ILR   He was seen by dr Tim Fan as an outpatient, and cryo PVI was recommended  He presented this hospital admission to undergo this procedure  Hospital Course: Raji Knight presented at his baseline state of health  After the procedure was explained in detail and consent was obtained, he underwent cryo PVI w/ CTI RFA without complications  He tolerated the procedure well  He was then monitored overnight for further observation  There were no acute issues or events overnight  The following morning he denied all cardiac complaints, including chest pain/pressure, dyspnea, palpitations, dizziness, lightheadedness, or syncope  His vital signs were reviewed and labs are stable  Telemetry showed NSR  His groins were soft without significant hematoma or recurrent bleeding  Physical exam showed regular rate and rhythm without significant murmurs, rubs or gallops  Lungs were clear to auscultation bilaterally without wheezes, rhonchi, or rales  There was no significant edema  He was given routine post ablation discharge instructions and restrictions, and these were explained in detail  He was given a two week follow up appointment with Dr Tim Fan, and he was instructed to follow up with his primary cardiologist as previously instructed      In terms of his medications, he is to continue his pradaxa for 3 months and if no atrial fibrillation on loop recorder this will be stopped  Protonix 40mg PO QAM x 30 days was also added  He is stable for discharge at this time with all questions answered  He was discussed in detail with Dr Nahed Osborne who is in agreement with this discharge summary  Of note possible small ASD was seen on ICE right right to left and left to right flow, plan is for JAYLEN in 3 months after healed to better clarify  Discharge Medications:  See after visit summary for reconciled discharge medications provided to patient and family  No prescriptions prior to admission  Pertininet Labs/diagnostics:  CBC with diff:   Results from last 7 days  Lab Units 09/05/18  0446 09/04/18  0641   WBC Thousand/uL 12 29* 5 76   HEMOGLOBIN g/dL 14 1 14 3   HEMATOCRIT % 40 3 41 5   MCV fL 85 86   PLATELETS Thousands/uL 186 175   MCH pg 29 7 29 5   MCHC g/dL 35 0 34 5   RDW % 11 6 11 7   MPV fL 10 5 10 7   NRBC AUTO /100 WBCs  --  0       BMP:  Results from last 7 days  Lab Units 09/05/18  0446 09/04/18  0641   SODIUM mmol/L 140 137   POTASSIUM mmol/L 3 7 3 8   CHLORIDE mmol/L 105 105   CO2 mmol/L 28 28   BUN mg/dL 12 22   CREATININE mg/dL 0 62 0 74   CALCIUM mg/dL 8 9 8 8       Magnesium:   Results from last 7 days  Lab Units 09/05/18  0446 09/04/18  0641   MAGNESIUM mg/dL 2 1 2 1       Coags:   Results from last 7 days  Lab Units 09/04/18  0643   INR  7 75         Complications: none    Condition at Discharge: good     Discharge instructions/Information to patient and family:   See after visit summary for information provided to patient and family  Provisions for Follow-Up Care:  See after visit summary for information related to follow-up care and any pertinent home health orders  Disposition: Home    Planned Readmission: No    Discharge Statement   I spent 45 minutes minutes discharging the patient  This time was spent on the day of discharge   I had direct contact with the patient on the day of discharge  Additional documentation is required if more than 30 minutes were spent on discharge   Evaluating the incision, discussing discharge instructions and restrictions, arranging follow up appointments, discussing medications

## 2018-09-05 NOTE — CASE MANAGEMENT
He does not need auth for his ablation [id number] on 9/4/18 at [facility]  [place] [facility] [id number]    [facility]    ---------------------------------------------------------------------------------------------------------------------------    09/04/18 1106  Outpatient No Charge Bed Once     Transfer Service: Cardiology       Question: Admitting Physician Answer: Young Chadwick        Cardiac eps/afib ablation    /64 (BP Location: Left arm)   Pulse 83   Temp 98 2 °F (36 8 °C) (Oral)   Resp 18   Ht 5' 9" (1 753 m)   Wt 90 7 kg (200 lb)   SpO2 96%   BMI 29 53 kg/m²     Scheduled Meds:  Current Facility-Administered Medications:  acetaminophen 650 mg Oral Q4H PRN Tanvir Samples, PA-C    dabigatran etexilate 150 mg Oral BID Tanvir Samples, PA-C    metoprolol succinate 25 mg Oral Daily Tanvir Samples, PA-C    pantoprazole 40 mg Oral Early Morning Tanvir Samples, PA-C    sodium chloride 20 mL/hr Intravenous Continuous Daria Springer CRNA Last Rate: Stopped (09/04/18 1200)     Continuous Infusions:  sodium chloride 20 mL/hr Last Rate: Stopped (09/04/18 1200)       =========================================================================      09/05/18 1020  Discharge patient Once     Discharge Disposition: Home/Self Care    Expected Discharge Date: 09/05/18

## 2018-09-05 NOTE — PLAN OF CARE
CARDIOVASCULAR - ADULT     Absence of cardiac dysrhythmias or at baseline rhythm Progressing        DISCHARGE PLANNING     Discharge to home or other facility with appropriate resources Progressing        INFECTION - ADULT     Absence or prevention of progression during hospitalization Progressing        Knowledge Deficit     Patient/family/caregiver demonstrates understanding of disease process, treatment plan, medications, and discharge instructions Progressing        PAIN - ADULT     Verbalizes/displays adequate comfort level or baseline comfort level Progressing        Potential for Falls     Patient will remain free of falls Progressing        SAFETY ADULT     Maintain or return to baseline ADL function Progressing     Maintain or return mobility status to optimal level Progressing

## 2018-09-05 NOTE — PLAN OF CARE
CARDIOVASCULAR - ADULT     Absence of cardiac dysrhythmias or at baseline rhythm Completed        DISCHARGE PLANNING     Discharge to home or other facility with appropriate resources Completed        INFECTION - ADULT     Absence or prevention of progression during hospitalization Completed        Knowledge Deficit     Patient/family/caregiver demonstrates understanding of disease process, treatment plan, medications, and discharge instructions Completed        PAIN - ADULT     Verbalizes/displays adequate comfort level or baseline comfort level Completed        Potential for Falls     Patient will remain free of falls Completed        SAFETY ADULT     Maintain or return to baseline ADL function Completed     Maintain or return mobility status to optimal level Completed

## 2018-10-02 ENCOUNTER — OFFICE VISIT (OUTPATIENT)
Dept: CARDIOLOGY CLINIC | Facility: CLINIC | Age: 33
End: 2018-10-02
Payer: COMMERCIAL

## 2018-10-02 VITALS
SYSTOLIC BLOOD PRESSURE: 116 MMHG | HEIGHT: 69 IN | BODY MASS INDEX: 30.2 KG/M2 | WEIGHT: 203.9 LBS | DIASTOLIC BLOOD PRESSURE: 68 MMHG | RESPIRATION RATE: 16 BRPM

## 2018-10-02 DIAGNOSIS — I48.0 PAROXYSMAL ATRIAL FIBRILLATION (HCC): Chronic | ICD-10-CM

## 2018-10-02 DIAGNOSIS — I48.0 PAF (PAROXYSMAL ATRIAL FIBRILLATION) (HCC): Primary | ICD-10-CM

## 2018-10-02 PROCEDURE — 99214 OFFICE O/P EST MOD 30 MIN: CPT | Performed by: PHYSICIAN ASSISTANT

## 2018-10-02 PROCEDURE — 93000 ELECTROCARDIOGRAM COMPLETE: CPT | Performed by: PHYSICIAN ASSISTANT

## 2018-10-02 NOTE — PATIENT INSTRUCTIONS
1  Finish your 1 month of protonix   2  Continue monitoring with your loop for atrial fibrillation  3   Stop pradaxa, start xarelto 20mg PO QHS tomorrow PM

## 2018-10-02 NOTE — PROGRESS NOTES
Cardiology Follow Up    Asa Su  1985  [id number]  HEART & VASCULAR 100 [facility] [place]  [address]        Assessment/Plan     1  PAF (paroxysmal atrial fibrillation) (HCC)  POCT ECG    JAYLEN    rivaroxaban (XARELTO) 20 mg tablet   2  Paroxysmal atrial fibrillation (HCC)       1  Paroxsymal nonvalvular atrial fibrillation, symptomatic    * s/p cryo PVI and RFA CTI on 18 by Dr Carolanne Apgar after failing flecanide    * his EF in 2-D ECHO in 2016 was 55%    * he is currently in NSR    * did have 1 episode of 12 minutes of afib on 18 which I reviewed on his loop interrogation    * at this time we will switch his AC from pradaxa to [address] which we will have him begin tomorrow PM, I provided him with 1 month of samples and will have office staff check on prices of [address]  If affordable we will fill the script for a 90 day supply until he can f/u with Dr Carolanne Apgar in 3 months time  At that appointment the decision can be made to continue or discontinue his [facility] as his CV is 0     * no flecanide as pill in pocket due to prolonged pauses on this in the past  Listed this as allergy in EPIC  2  Possible ASD   * during his cryo ablation a possible ASD was noted on ICE    * plan for JAYLEN prior to his appointment with Dr Carolanne Apgar for further evaluation     History of Present Illness     HPI/INTERVAL HISTORY: Asa Su is a 34 y o  male with a history of paroxsymal nonvalvular atrial fibrillation AC w/ pradaxa failed flecanide who presents to the EP office today after his first atrial fibrillation ablation  He is normally a patient of Dr Carolanne Apgar  On 18 patient underwent cryo PVI and CTI RFA without any complications  He was kept overnight, discharged home in NSR and on protonix 40mg PO QDay x 30 days  Since arriving home he did have one 12 minute episode of atrial fibrillation while giving his son a bath on 18 which was confirmed by ILR report  He has not any other episodes of atrial fibrillation with that 12 minute episode being self terminating  His only request today is to change his AC as Pradaxa leads to some GI upset  Review of Systems  ROS as noted above, otherwise 12 point review of systems was performed and is negative  Historical Information   Social History     Social History    Marital status: /Civil Union     Spouse name: N/A    Number of children: N/A    Years of education: N/A     Occupational History    Not on file       Social History Main Topics    Smoking status: Former Smoker    Smokeless tobacco: Never Used    Alcohol use No    Drug use: No    Sexual activity: Yes     Other Topics Concern    Not on file     Social History Narrative    No narrative on file     Past Medical History:   Diagnosis Date    Atrial fibrillation ([address] )     Palpitations     Pericarditis 12/2012    Admission to [facility] at that time    Status post placement of implantable loop recorder      Past Surgical History:   Procedure Laterality Date    ADENOIDECTOMY       History   Alcohol Use No     History   Drug Use No     History   Smoking Status    Former Smoker   Smokeless Tobacco    Never Used     Family History   Problem Relation Age of Onset    Atrial fibrillation Father     Hypertension Father     Heart attack Neg Hx     Stroke Neg Hx     Anuerysm Neg Hx     Clotting disorder Neg Hx     Hyperlipidemia Neg Hx     Heart failure Neg Hx        Meds/Allergies       Current Outpatient Prescriptions:     metoprolol succinate (TOPROL-XL) 25 mg 24 hr tablet, Take 1 tablet (25 mg total) by mouth daily, Disp: 30 tablet, Rfl: 10    Multiple Vitamin (MULTIVITAMIN) capsule, Take 1 capsule by mouth daily, Disp: , Rfl:     pantoprazole (PROTONIX) 40 mg tablet, Take 1 tablet (40 mg total) by mouth daily in the early morning, Disp: 30 tablet, Rfl: 0    rivaroxaban (XARELTO) 20 mg tablet, Take 1 tablet (20 mg total) by mouth daily with dinner, Disp: 90 tablet, Rfl: 0    Allergies   Allergen Reactions    Tambocor [Flecainide]      18 second pause  Objective   Vitals: Blood pressure 116/68, resp  rate 16, height 5' 9" (1 753 m), weight 92 5 kg (203 lb 14 4 oz)  Physical Exam   Constitutional: He is oriented to person, place, and time  He appears well-developed and well-nourished  HENT:   Head: Normocephalic and atraumatic  Eyes: Pupils are equal, round, and reactive to light  EOM are normal    Neck: Normal range of motion  Neck supple  Cardiovascular: Normal rate and regular rhythm  Pulmonary/Chest: Effort normal and breath sounds normal    Abdominal: Soft  Bowel sounds are normal    Musculoskeletal: Normal range of motion  Neurological: He is alert and oriented to person, place, and time  Skin: Skin is warm and dry  Psychiatric: He has a normal mood and affect  Labs:not applicable    Imaging: I have personally reviewed pertinent reports  ECHO:   Results for orders placed during the hospital encounter of 16   Echo complete with contrast if indicated    Narrative [address]  [phone]    Transthoracic Echocardiogram  2D, M-mode, Doppler, and Color Doppler    Study date:  2016    Patient: Landry Carrillo  MR number: [id number]  Account number: [de-identified]  : 1985  Age: 31 years  Gender: Male  Status: Outpatient  Location: [address] [place] [address] [facility]  Height: 70 in  Weight: 189 6 lb  BP: 108/ 64 mmHg    Indications: Atrial fibrillation  Diagnoses: I48 0 - Atrial fibrillation    Sonographer:  BELL Fields  Referring Physician:  Carmen Miles DO  Group:  [facility]  cc:  Feli Downs MD  Interpreting Physician:  Levy Mitchell MD    SUMMARY    LEFT VENTRICLE:  Size was normal   Systolic function was normal  Ejection fraction was estimated to be 55 %    Wall thickness was normal   Left ventricular diastolic function parameters were normal     RIGHT VENTRICLE:  The size was normal   Systolic function was normal     TRICUSPID VALVE:  There was trace regurgitation  HISTORY: PRIOR HISTORY: Atrial Fibrillation, Chest Pain, Pericarditis,    PROCEDURE: The study was performed in the [facility]  This was a routine study  The transthoracic approach was used  The study  included complete 2D imaging, M-mode, complete spectral Doppler, and color  Doppler  The heart rate was 60 bpm, at the start of the study  Images were  obtained from the parasternal, apical, subcostal, and suprasternal notch  acoustic windows  Image quality was good  LEFT VENTRICLE: Size was normal  Systolic function was normal  Ejection  fraction was estimated to be 55 %  There were no regional wall motion  abnormalities  Wall thickness was normal  DOPPLER: Left ventricular diastolic  function parameters were normal     RIGHT VENTRICLE: The size was normal  Systolic function was normal  Wall  thickness was normal     LEFT ATRIUM: Size was normal     RIGHT ATRIUM: Size was normal     MITRAL VALVE: Valve structure was normal  There was normal leaflet separation  DOPPLER: The transmitral velocity was within the normal range  There was no  evidence for stenosis  There was no regurgitation  AORTIC VALVE: The valve was trileaflet  Leaflets exhibited normal thickness and  normal cuspal separation  DOPPLER: Transaortic velocity was within the normal  range  There was no evidence for stenosis  There was no regurgitation  TRICUSPID VALVE: The valve structure was normal  There was normal leaflet  separation  DOPPLER: The transtricuspid velocity was within the normal range  There was no evidence for stenosis  There was trace regurgitation  The  tricuspid jet envelope definition was inadequate for estimation of RV systolic  pressure   There are no indirect findings suggestive of moderate or severe  pulmonary hypertension  PULMONIC VALVE: Leaflets exhibited normal thickness, no calcification, and  normal cuspal separation  DOPPLER: The transpulmonic velocity was within the  normal range  There was no regurgitation  PERICARDIUM: There was no pericardial effusion  The pericardium was normal in  appearance  AORTA: The root exhibited normal size  SYSTEMIC VEINS: IVC: The inferior vena cava was normal in size and course    Respirophasic changes were normal     SYSTEM MEASUREMENT TABLES    2D  %FS: 30 56 %  AV Diam: 3 16 cm  EDV(Teich): 114 31 ml  EF Biplane: 50 74 %  EF(Cube): 66 52 %  EF(Teich): 57 86 %  ESV(Cube): 40 06 ml  ESV(Teich): 48 17 ml  IVSd: 0 88 cm  LA Area: 19 cm2  LA Diam: 3 8 cm  LVEDV MOD A2C: 138 49 ml  LVEDV MOD A4C: 93 85 ml  LVEDV MOD BP: 119 51 ml  LVEF MOD A2C: 49 47 %  LVEF MOD A4C: 53 87 %  LVESV MOD A2C: 69 98 ml  LVESV MOD A4C: 43 29 ml  LVESV MOD BP: 58 87 ml  LVIDd: 4 93 cm  LVIDs: 3 42 cm  LVLd A2C: 10 02 cm  LVLd A4C: 9 1 cm  LVLs A2C: 8 5 cm  LVLs A4C: 7 37 cm  LVPWd: 0 89 cm  RA Area: 18 12 cm2  RV Diam: 3 64 cm  SI(Cube): 39 02 ml/m2  SI(Teich): 32 42 ml/m2  SV MOD A2C: 68 51 ml  SV MOD A4C: 50 56 ml  SV(Cube): 79 6 ml  SV(Teich): 66 14 ml    MM  TAPSE: 2 3 cm    PW  E': 0 1 m/s  E/E': 8 56  MV A Humberto: 0 54 m/s  MV Dec Bell: 5 58 m/s2  MV DecT: 148 04 ms  MV E Humberto: 0 83 m/s  MV E/A Ratio: 1 52    In[employer]oci[employer] Commission Accredited Echocardiography Laboratory    Prepared and electronically signed by    Jill Jackson MD  Signed 20-Jan-2016 15:16:47         CATH/STRESS TEST:   None     EKG:   NSR

## 2018-10-04 ENCOUNTER — TELEPHONE (OUTPATIENT)
Dept: CARDIOLOGY CLINIC | Facility: CLINIC | Age: 33
End: 2018-10-04

## 2018-10-04 NOTE — TELEPHONE ENCOUNTER
Called pt's Rx plan, price for Xarelto- 30 day supply is $35 and 90 day is $105  Called pt, LMOM in regards to costs of meds

## 2018-10-09 NOTE — TELEPHONE ENCOUNTER
Ok great, get a hold of him and see what he says! If he is ok with this price only needs 2 refills sent w/ a 30 day supply

## 2019-01-09 ENCOUNTER — OFFICE VISIT (OUTPATIENT)
Dept: CARDIOLOGY CLINIC | Facility: CLINIC | Age: 34
End: 2019-01-09
Payer: COMMERCIAL

## 2019-01-09 VITALS
HEIGHT: 69 IN | SYSTOLIC BLOOD PRESSURE: 108 MMHG | WEIGHT: 192.7 LBS | BODY MASS INDEX: 28.54 KG/M2 | DIASTOLIC BLOOD PRESSURE: 64 MMHG | HEART RATE: 70 BPM

## 2019-01-09 DIAGNOSIS — I48.0 PAF (PAROXYSMAL ATRIAL FIBRILLATION) (HCC): Primary | ICD-10-CM

## 2019-01-09 PROCEDURE — 93000 ELECTROCARDIOGRAM COMPLETE: CPT | Performed by: INTERNAL MEDICINE

## 2019-01-09 PROCEDURE — 99214 OFFICE O/P EST MOD 30 MIN: CPT | Performed by: INTERNAL MEDICINE

## 2019-01-09 NOTE — PROGRESS NOTES
HEART AND [address]     Outpatient  Follow-up  Today's Date: 01/09/19        Patient name: Jimmie Hill  YOB: 1985  Sex: male         Chief Complaint: f/u afib      ASSESSMENT:  Problem List Items Addressed This Visit     None      Visit Diagnoses     PAF (paroxysmal atrial fibrillation) ([facility] [address] )    -  Primary    Relevant Orders    POCT ECG    Echo complete with contrast if indicated          35 yo male  1) S/p successful cryo afib  Had one short episode 3 weeks after ablation, none  Since then  Prior to ablation afib episodes lasted much longer landing him in hospital every time  Afib w/ RVR Last episode was July 6th 2018, converted on his own in hospital, was placed on metoprolol and does have fatigue from that  Before that had episode a year ago and had 19s pause on Flecaindie oop recorder confirmed mutliple pauses 6s,7s, 19s Pause post conversion  Was bradycardic 40bpm after that in ER  For a couple weeks after that had a few dizzy spells and headaches, felt unwell, but all has resolved  Before that had episode on 3/19/17 and before that was 1/17/16   SOB  [id number]    2) Cardiac MRI 2012 was normal, no structural heart disease but he did have pericarditis in 2012  3) Syncope x3 previously vagal mediated  4) PFO found during ablation      PLAN:  1  TTE w bubble study to assess PFO  2  Continue metoprolol 25mg daily      HPI/Subjective:   35 yo male  1) S/p successful cryo afib  Had one short episode 3 weeks after ablation, none  Since then  Prior to ablation afib episodes lasted much longer landing him in hospital every time  Afib w/ RVR Last episode was July 6th 2018, converted on his own in hospital, was placed on metoprolol and does have fatigue from that    Before that had episode a year ago and had 19s pause on Flecaindie oop recorder confirmed mutliple pauses 6s,7s, 19s Pause post conversion  Was bradycardic 40bpm after that in ER  For a couple weeks after that had a few dizzy spells and headaches, felt unwell, but all has resolved  Before that had episode on 3/19/17 and before that was 1/17/16   SOB  [id number]    2) Cardiac MRI 2012 was normal, no structural heart disease but he did have pericarditis in 2012  3) Syncope x3 previously vagal mediated  4) PFO found during ablation    Please note HPI is listed by problem with with update following it, it is copied again in the assessment above and reflects medical decision making as well  Complete 12 point ROS reviewed and otherwise non pertinent or negative except as per HPI pertinent positives in Cardiovascular and Respiratory emphasized  Please see paper chart for outpatient clinic patients where the patient completed the 12 point ROS survey  Past Medical History:   Diagnosis Date    Atrial fibrillation ([address] )     Palpitations     Pericarditis 12/2012    Admission to [facility] at that time    Status post placement of implantable loop recorder        Allergies   Allergen Reactions    Tambocor [Flecainide]      18 second pause  I reviewed the Home Medication list and Allergies in the chart  Scheduled Meds:  Current Outpatient Prescriptions   Medication Sig Dispense Refill    metoprolol succinate (TOPROL-XL) 25 mg 24 hr tablet Take 1 tablet (25 mg total) by mouth daily 30 tablet 10    Multiple Vitamin (MULTIVITAMIN) capsule Take 1 capsule by mouth daily       No current facility-administered medications for this visit        PRN Meds:         Family History   Problem Relation Age of Onset    Atrial fibrillation Father     Hypertension Father     Heart attack Neg Hx     Stroke Neg Hx     Anuerysm Neg Hx     Clotting disorder Neg Hx     Hyperlipidemia Neg Hx     Heart failure Neg Hx        Social History     Social History    Marital status: /Civil Union     Spouse name: N/A    Number of children: N/A    Years of education: N/A     Occupational History    Not on file  Social History Main Topics    Smoking status: Former Smoker    Smokeless tobacco: Never Used    Alcohol use No    Drug use: No    Sexual activity: Yes     Other Topics Concern    Not on file     Social History Narrative    No narrative on file         OBJECTIVE:    /64 (BP Location: Right arm, Patient Position: Sitting, Cuff Size: Large)   Pulse 70   Ht 5' 9" (1 753 m)   Wt 87 4 kg (192 lb 11 2 oz)   BMI 28 46 kg/m²   Vitals:    01/09/19 1532   Weight: 87 4 kg (192 lb 11 2 oz)     GEN: No acute distress, Alert and oriented, well appearing  HEENT:Head, neck, ears, oral pharynx: Mucus membranes moist, oral pharynx clear, nares clear  External ears normal  EYES: Pupils equal, sclera anicteric, midline, normal conjuctiva  NECK: No JVD, supple, no obvious masses or thryomegaly or goiter  CARDIOVASCULAR:  RRR, No murmur, rub, gallops S1,S2  LUNGS: Clear To auscultation bilaterally, normal effort, no rales, rhonchi, crackles  ABDOMEN:  nondistended,  without obvious organomegaly or ascites  EXTREMITIES/VASCULAR:  No edema  Radial pulses intact, pedal pulses difficult to palpate, warm an well perfused  PSYCH: Normal Affect, no overt suicidal ideation, linear speech pattern without evidence of psychosis  NEURO: Grossly intact, moving all extremiteis equal, face symmetric, alert and responsive, no obvious focal defecits  GAIT:  Ambulates normally without difficulty  HEME: No bleeding, bruising, petechia, purpura  SKIN: No significant rashes, warm, no diaphoresis or pallor       Lab Results:       LABS:      Chemistry        Component Value Date/Time     01/01/2016 0040    K 3 7 09/05/2018 0446    K 3 0 (L) 01/01/2016 0040     09/05/2018 0446     01/01/2016 0040    CO2 28 09/05/2018 0446    CO2 26 01/01/2016 0040    BUN 12 09/05/2018 0446    BUN 13 2016 0040    CREATININE 0 62 2018 0446    CREATININE 0 82 2016 0040        Component Value Date/Time    CALCIUM 8 9 2018 0446    CALCIUM 8 7 2016 0040    ALKPHOS 78 2017 1748    ALKPHOS 49 2016 0040    AST 13 2017 1748    AST 23 2016 0040    ALT 30 2017 1748    ALT 33 2016 0040    BILITOT 0 65 2016 0040            No results found for: CHOL  No results found for: HDL  No results found for: LDLCALC  No results found for: TRIG  No results found for: CHOLHDL    IMAGING: No results found  Cardiac testing:   Results for orders placed during the hospital encounter of 16   Echo complete with contrast if indicated    Narrative [facility] [facility]  [phone]    Transthoracic Echocardiogram  2D, M-mode, Doppler, and Color Doppler    Study date:  2016    Patient: Emigdio Roca  MR number: [id number]  Account number: [de-identified]  : 1985  Age: 31 years  Gender: Male  Status: Outpatient  Location: [facility]  Height: 70 in  Weight: 189 6 lb  BP: 108/ 64 mmHg    Indications: Atrial fibrillation  Diagnoses: I48 0 - Atrial fibrillation    Sonographer:  BELL Rodriguez  Referring Physician:  Sravani Augustine DO  Group:  [facility]  cc:  Verner Cart, MD  Interpreting Physician:  Maria Ines Gonzales MD    SUMMARY    LEFT VENTRICLE:  Size was normal   Systolic function was normal  Ejection fraction was estimated to be 55 %  Wall thickness was normal   Left ventricular diastolic function parameters were normal     RIGHT VENTRICLE:  The size was normal   Systolic function was normal     TRICUSPID VALVE:  There was trace regurgitation  HISTORY: PRIOR HISTORY: Atrial Fibrillation, Chest Pain, Pericarditis,    PROCEDURE: The study was performed in the [facility]  This was a routine study   The transthoracic approach was used  The study  included complete 2D imaging, M-mode, complete spectral Doppler, and color  Doppler  The heart rate was 60 bpm, at the start of the study  Images were  obtained from the parasternal, apical, subcostal, and suprasternal notch  acoustic windows  Image quality was good  LEFT VENTRICLE: Size was normal  Systolic function was normal  Ejection  fraction was estimated to be 55 %  There were no regional wall motion  abnormalities  Wall thickness was normal  DOPPLER: Left ventricular diastolic  function parameters were normal     RIGHT VENTRICLE: The size was normal  Systolic function was normal  Wall  thickness was normal     LEFT ATRIUM: Size was normal     RIGHT ATRIUM: Size was normal     MITRAL VALVE: Valve structure was normal  There was normal leaflet separation  DOPPLER: The transmitral velocity was within the normal range  There was no  evidence for stenosis  There was no regurgitation  AORTIC VALVE: The valve was trileaflet  Leaflets exhibited normal thickness and  normal cuspal separation  DOPPLER: Transaortic velocity was within the normal  range  There was no evidence for stenosis  There was no regurgitation  TRICUSPID VALVE: The valve structure was normal  There was normal leaflet  separation  DOPPLER: The transtricuspid velocity was within the normal range  There was no evidence for stenosis  There was trace regurgitation  The  tricuspid jet envelope definition was inadequate for estimation of RV systolic  pressure  There are no indirect findings suggestive of moderate or severe  pulmonary hypertension  PULMONIC VALVE: Leaflets exhibited normal thickness, no calcification, and  normal cuspal separation  DOPPLER: The transpulmonic velocity was within the  normal range  There was no regurgitation  PERICARDIUM: There was no pericardial effusion  The pericardium was normal in  appearance  AORTA: The root exhibited normal size      SYSTEMIC VEINS: IVC: The inferior vena cava was normal in size and course  Respirophasic changes were normal     SYSTEM MEASUREMENT TABLES    2D  %FS: 30 56 %  AV Diam: 3 16 cm  EDV(Teich): 114 31 ml  EF Biplane: 50 74 %  EF(Cube): 66 52 %  EF(Teich): 57 86 %  ESV(Cube): 40 06 ml  ESV(Teich): 48 17 ml  IVSd: 0 88 cm  LA Area: 19 cm2  LA Diam: 3 8 cm  LVEDV MOD A2C: 138 49 ml  LVEDV MOD A4C: 93 85 ml  LVEDV MOD BP: 119 51 ml  LVEF MOD A2C: 49 47 %  LVEF MOD A4C: 53 87 %  LVESV MOD A2C: 69 98 ml  LVESV MOD A4C: 43 29 ml  LVESV MOD BP: 58 87 ml  LVIDd: 4 93 cm  LVIDs: 3 42 cm  LVLd A2C: 10 02 cm  LVLd A4C: 9 1 cm  LVLs A2C: 8 5 cm  LVLs A4C: 7 37 cm  LVPWd: 0 89 cm  RA Area: 18 12 cm2  RV Diam: 3 64 cm  SI(Cube): 39 02 ml/m2  SI(Teich): 32 42 ml/m2  SV MOD A2C: 68 51 ml  SV MOD A4C: 50 56 ml  SV(Cube): 79 6 ml  SV(Teich): 66 14 ml    MM  TAPSE: 2 3 cm    PW  E': 0 1 m/s  E/E': 8 56  MV A Humberto: 0 54 m/s  MV Dec [place]: 5 58 m/s2  MV DecT: 148 04 ms  MV E Humberto: 0 83 m/s  MV E/A Ratio: 1 52    [employer] Commission Accredited Echocardiography Laboratory    Prepared and electronically signed by    Aure Garcia MD  Signed 20-Jan-2016 15:16:47       No results found for this or any previous visit  No results found for this or any previous visit  No results found for this or any previous visit  I reviewed and interpreted the following LABS/EKG/TELE/IMAGING and below is summary of my interpretation (if data available):   Interrogation of Pacemaker/Defibrillator/Loop  No afib on recent check

## 2019-02-04 ENCOUNTER — HOSPITAL ENCOUNTER (OUTPATIENT)
Dept: NON INVASIVE DIAGNOSTICS | Facility: CLINIC | Age: 34
Discharge: HOME | End: 2019-02-04
Payer: COMMERCIAL

## 2019-02-04 DIAGNOSIS — I48.0 PAF (PAROXYSMAL ATRIAL FIBRILLATION) (HCC): ICD-10-CM

## 2019-02-04 PROCEDURE — 93306 TTE W/DOPPLER COMPLETE: CPT

## 2019-02-04 PROCEDURE — 93799 UNLISTED CV SVC/PROCEDURE: CPT

## 2019-02-04 PROCEDURE — 93306 TTE W/DOPPLER COMPLETE: CPT | Performed by: INTERNAL MEDICINE

## 2019-02-28 ENCOUNTER — REMOTE DEVICE CLINIC VISIT (OUTPATIENT)
Dept: CARDIOLOGY CLINIC | Facility: CLINIC | Age: 34
End: 2019-02-28
Payer: COMMERCIAL

## 2019-02-28 DIAGNOSIS — I48.0 PAROXYSMAL ATRIAL FIBRILLATION (HCC): Primary | ICD-10-CM

## 2019-02-28 DIAGNOSIS — Z95.818 PRESENCE OF OTHER CARDIAC IMPLANTS AND GRAFTS: ICD-10-CM

## 2019-02-28 PROCEDURE — 93299 PR REM INTERROG ICPMS/SCRMS <30 D TECH REVIEW: CPT | Performed by: INTERNAL MEDICINE

## 2019-02-28 PROCEDURE — 93298 REM INTERROG DEV EVAL SCRMS: CPT | Performed by: INTERNAL MEDICINE

## 2019-02-28 NOTE — PROGRESS NOTES
MDT LOOP  CARELINK TRANSMISSION: LOOP RECORDER  PRESENTING RHYTHM NSR @ 80 BPM  BATTERY STATUS "OK"  2 PT ACTIVATED EPISODES PREVIOUSLY ADDRESSED IN ALERTS  10 AF EPISODES W/ EGRAMS SUGGESTING SR W/ PAC  HOWEVER CAN NOT R/O AF  AF BURDEN = 0 2%  PT TAKES METOPROLOL TART  NO AC OH SS OFFICE NOTE [id number] = 0   # 22 WAS PREVIOUSLY ADDRESSED IN ALERT  NO NEW PATIENT ACTIVATED EPISODES  NORMAL DEVICE FUNCTION   DL

## 2019-07-17 DIAGNOSIS — I48.91 ATRIAL FIBRILLATION WITH RAPID VENTRICULAR RESPONSE (HCC): ICD-10-CM

## 2019-07-17 RX ORDER — METOPROLOL SUCCINATE 25 MG/1
25 TABLET, EXTENDED RELEASE ORAL DAILY
Qty: 30 TABLET | Refills: 10 | Status: SHIPPED | OUTPATIENT
Start: 2019-07-17

## 2019-08-19 ENCOUNTER — REMOTE DEVICE CLINIC VISIT (OUTPATIENT)
Dept: CARDIOLOGY CLINIC | Facility: CLINIC | Age: 34
End: 2019-08-19
Payer: COMMERCIAL

## 2019-08-19 DIAGNOSIS — Z95.818 PRESENCE OF CARDIAC DEVICE: Primary | ICD-10-CM

## 2019-08-19 PROCEDURE — 93298 REM INTERROG DEV EVAL SCRMS: CPT | Performed by: INTERNAL MEDICINE

## 2019-08-19 PROCEDURE — 93296 REM INTERROG EVL PM/IDS: CPT | Performed by: INTERNAL MEDICINE

## 2019-08-19 NOTE — PROGRESS NOTES
Results for orders placed or performed in visit on 08/19/19   Cardiac EP device report    Narrative    MDT LOOP  CARELINK TRANSMISSION: BATTERY STATUS "OK"  PRESENTING NSR @ 90 BPM  NO SIGNIFICANT HIGH RATE EPISODES    AF 0%  NORMAL DEVICE FUNCTION   PL

## 2020-03-05 ENCOUNTER — TELEPHONE (OUTPATIENT)
Dept: OTHER | Facility: OTHER | Age: 35
End: 2020-03-05